# Patient Record
Sex: MALE | Race: WHITE | NOT HISPANIC OR LATINO | URBAN - METROPOLITAN AREA
[De-identification: names, ages, dates, MRNs, and addresses within clinical notes are randomized per-mention and may not be internally consistent; named-entity substitution may affect disease eponyms.]

---

## 2017-01-27 ENCOUNTER — APPOINTMENT (RX ONLY)
Dept: URBAN - METROPOLITAN AREA CLINIC 167 | Facility: CLINIC | Age: 77
Setting detail: DERMATOLOGY
End: 2017-01-27

## 2017-01-27 DIAGNOSIS — D22 MELANOCYTIC NEVI: ICD-10-CM

## 2017-01-27 DIAGNOSIS — Z71.89 OTHER SPECIFIED COUNSELING: ICD-10-CM

## 2017-01-27 DIAGNOSIS — D18.0 HEMANGIOMA: ICD-10-CM

## 2017-01-27 DIAGNOSIS — L82.1 OTHER SEBORRHEIC KERATOSIS: ICD-10-CM

## 2017-01-27 DIAGNOSIS — Z85.828 PERSONAL HISTORY OF OTHER MALIGNANT NEOPLASM OF SKIN: ICD-10-CM

## 2017-01-27 DIAGNOSIS — L21.8 OTHER SEBORRHEIC DERMATITIS: ICD-10-CM

## 2017-01-27 PROBLEM — D22.5 MELANOCYTIC NEVI OF TRUNK: Status: ACTIVE | Noted: 2017-01-27

## 2017-01-27 PROBLEM — D18.01 HEMANGIOMA OF SKIN AND SUBCUTANEOUS TISSUE: Status: ACTIVE | Noted: 2017-01-27

## 2017-01-27 PROCEDURE — ? COUNSELING

## 2017-01-27 PROCEDURE — ? TREATMENT REGIMEN

## 2017-01-27 PROCEDURE — 99213 OFFICE O/P EST LOW 20 MIN: CPT

## 2017-01-27 ASSESSMENT — LOCATION DETAILED DESCRIPTION DERM
LOCATION DETAILED: RIGHT MEDIAL UPPER BACK
LOCATION DETAILED: LEFT INFERIOR MEDIAL UPPER BACK
LOCATION DETAILED: LEFT SUPERIOR MEDIAL UPPER BACK
LOCATION DETAILED: LEFT CENTRAL MALAR CHEEK
LOCATION DETAILED: LEFT DISTAL POSTERIOR UPPER ARM
LOCATION DETAILED: LEFT DISTAL POSTERIOR THIGH
LOCATION DETAILED: POSTERIOR MID-PARIETAL SCALP

## 2017-01-27 ASSESSMENT — LOCATION ZONE DERM
LOCATION ZONE: ARM
LOCATION ZONE: FACE
LOCATION ZONE: SCALP
LOCATION ZONE: TRUNK
LOCATION ZONE: LEG

## 2017-01-27 ASSESSMENT — LOCATION SIMPLE DESCRIPTION DERM
LOCATION SIMPLE: RIGHT UPPER BACK
LOCATION SIMPLE: LEFT UPPER ARM
LOCATION SIMPLE: LEFT UPPER BACK
LOCATION SIMPLE: POSTERIOR SCALP
LOCATION SIMPLE: LEFT CHEEK
LOCATION SIMPLE: LEFT POSTERIOR THIGH

## 2017-08-28 ENCOUNTER — APPOINTMENT (RX ONLY)
Dept: URBAN - METROPOLITAN AREA CLINIC 167 | Facility: CLINIC | Age: 77
Setting detail: DERMATOLOGY
End: 2017-08-28

## 2017-08-28 DIAGNOSIS — Z85.828 PERSONAL HISTORY OF OTHER MALIGNANT NEOPLASM OF SKIN: ICD-10-CM

## 2017-08-28 DIAGNOSIS — D22 MELANOCYTIC NEVI: ICD-10-CM

## 2017-08-28 DIAGNOSIS — L21.8 OTHER SEBORRHEIC DERMATITIS: ICD-10-CM

## 2017-08-28 DIAGNOSIS — Z71.89 OTHER SPECIFIED COUNSELING: ICD-10-CM

## 2017-08-28 DIAGNOSIS — L82.1 OTHER SEBORRHEIC KERATOSIS: ICD-10-CM

## 2017-08-28 PROBLEM — D22.71 MELANOCYTIC NEVI OF RIGHT LOWER LIMB, INCLUDING HIP: Status: ACTIVE | Noted: 2017-08-28

## 2017-08-28 PROBLEM — D22.5 MELANOCYTIC NEVI OF TRUNK: Status: ACTIVE | Noted: 2017-08-28

## 2017-08-28 PROBLEM — D22.61 MELANOCYTIC NEVI OF RIGHT UPPER LIMB, INCLUDING SHOULDER: Status: ACTIVE | Noted: 2017-08-28

## 2017-08-28 PROBLEM — D22.72 MELANOCYTIC NEVI OF LEFT LOWER LIMB, INCLUDING HIP: Status: ACTIVE | Noted: 2017-08-28

## 2017-08-28 PROBLEM — D22.62 MELANOCYTIC NEVI OF LEFT UPPER LIMB, INCLUDING SHOULDER: Status: ACTIVE | Noted: 2017-08-28

## 2017-08-28 PROCEDURE — ? TREATMENT REGIMEN

## 2017-08-28 PROCEDURE — ? COUNSELING

## 2017-08-28 PROCEDURE — 99214 OFFICE O/P EST MOD 30 MIN: CPT

## 2017-08-28 PROCEDURE — ? PRESCRIPTION

## 2017-08-28 RX ORDER — HYDROCORTISONE 25 MG/G
CREAM TOPICAL
Qty: 1 | Refills: 4 | Status: ERX | COMMUNITY
Start: 2017-08-28

## 2017-08-28 RX ADMIN — HYDROCORTISONE: 25 CREAM TOPICAL at 00:00

## 2017-08-28 ASSESSMENT — LOCATION DETAILED DESCRIPTION DERM
LOCATION DETAILED: RIGHT INFERIOR MEDIAL UPPER BACK
LOCATION DETAILED: GLABELLA
LOCATION DETAILED: INFERIOR THORACIC SPINE
LOCATION DETAILED: RIGHT DISTAL POSTERIOR THIGH
LOCATION DETAILED: LEFT DISTAL POSTERIOR THIGH
LOCATION DETAILED: RIGHT DISTAL POSTERIOR UPPER ARM
LOCATION DETAILED: RIGHT PROXIMAL PRETIBIAL REGION
LOCATION DETAILED: LEFT CENTRAL MALAR CHEEK
LOCATION DETAILED: LEFT DISTAL POSTERIOR UPPER ARM

## 2017-08-28 ASSESSMENT — LOCATION ZONE DERM
LOCATION ZONE: TRUNK
LOCATION ZONE: LEG
LOCATION ZONE: FACE
LOCATION ZONE: ARM

## 2017-08-28 ASSESSMENT — LOCATION SIMPLE DESCRIPTION DERM
LOCATION SIMPLE: UPPER BACK
LOCATION SIMPLE: RIGHT UPPER BACK
LOCATION SIMPLE: LEFT CHEEK
LOCATION SIMPLE: RIGHT POSTERIOR THIGH
LOCATION SIMPLE: RIGHT PRETIBIAL REGION
LOCATION SIMPLE: LEFT POSTERIOR THIGH
LOCATION SIMPLE: RIGHT UPPER ARM
LOCATION SIMPLE: GLABELLA
LOCATION SIMPLE: LEFT UPPER ARM

## 2018-02-26 ENCOUNTER — APPOINTMENT (RX ONLY)
Dept: URBAN - METROPOLITAN AREA CLINIC 167 | Facility: CLINIC | Age: 78
Setting detail: DERMATOLOGY
End: 2018-02-26

## 2018-02-26 DIAGNOSIS — Z85.828 PERSONAL HISTORY OF OTHER MALIGNANT NEOPLASM OF SKIN: ICD-10-CM

## 2018-02-26 DIAGNOSIS — L82.1 OTHER SEBORRHEIC KERATOSIS: ICD-10-CM

## 2018-02-26 DIAGNOSIS — D22 MELANOCYTIC NEVI: ICD-10-CM

## 2018-02-26 DIAGNOSIS — L21.8 OTHER SEBORRHEIC DERMATITIS: ICD-10-CM

## 2018-02-26 DIAGNOSIS — D18.0 HEMANGIOMA: ICD-10-CM

## 2018-02-26 PROBLEM — D22.61 MELANOCYTIC NEVI OF RIGHT UPPER LIMB, INCLUDING SHOULDER: Status: ACTIVE | Noted: 2018-02-26

## 2018-02-26 PROBLEM — D22.5 MELANOCYTIC NEVI OF TRUNK: Status: ACTIVE | Noted: 2018-02-26

## 2018-02-26 PROBLEM — D18.01 HEMANGIOMA OF SKIN AND SUBCUTANEOUS TISSUE: Status: ACTIVE | Noted: 2018-02-26

## 2018-02-26 PROBLEM — D22.62 MELANOCYTIC NEVI OF LEFT UPPER LIMB, INCLUDING SHOULDER: Status: ACTIVE | Noted: 2018-02-26

## 2018-02-26 PROBLEM — D22.72 MELANOCYTIC NEVI OF LEFT LOWER LIMB, INCLUDING HIP: Status: ACTIVE | Noted: 2018-02-26

## 2018-02-26 PROBLEM — D22.71 MELANOCYTIC NEVI OF RIGHT LOWER LIMB, INCLUDING HIP: Status: ACTIVE | Noted: 2018-02-26

## 2018-02-26 PROCEDURE — ? COUNSELING

## 2018-02-26 PROCEDURE — 99213 OFFICE O/P EST LOW 20 MIN: CPT

## 2018-02-26 ASSESSMENT — LOCATION SIMPLE DESCRIPTION DERM
LOCATION SIMPLE: RIGHT UPPER BACK
LOCATION SIMPLE: ABDOMEN
LOCATION SIMPLE: UPPER BACK
LOCATION SIMPLE: RIGHT PRETIBIAL REGION
LOCATION SIMPLE: RIGHT POSTERIOR THIGH
LOCATION SIMPLE: LEFT POSTERIOR THIGH
LOCATION SIMPLE: LEFT UPPER ARM
LOCATION SIMPLE: RIGHT UPPER ARM
LOCATION SIMPLE: POSTERIOR SCALP
LOCATION SIMPLE: LEFT CHEEK

## 2018-02-26 ASSESSMENT — LOCATION DETAILED DESCRIPTION DERM
LOCATION DETAILED: RIGHT DISTAL POSTERIOR UPPER ARM
LOCATION DETAILED: RIGHT DISTAL POSTERIOR THIGH
LOCATION DETAILED: INFERIOR THORACIC SPINE
LOCATION DETAILED: RIGHT INFERIOR MEDIAL UPPER BACK
LOCATION DETAILED: RIGHT PROXIMAL PRETIBIAL REGION
LOCATION DETAILED: LEFT CENTRAL MALAR CHEEK
LOCATION DETAILED: LEFT DISTAL POSTERIOR UPPER ARM
LOCATION DETAILED: POSTERIOR MID-PARIETAL SCALP
LOCATION DETAILED: LEFT DISTAL POSTERIOR THIGH
LOCATION DETAILED: PERIUMBILICAL SKIN

## 2018-02-26 ASSESSMENT — LOCATION ZONE DERM
LOCATION ZONE: ARM
LOCATION ZONE: SCALP
LOCATION ZONE: TRUNK
LOCATION ZONE: FACE
LOCATION ZONE: LEG

## 2018-09-06 ENCOUNTER — APPOINTMENT (RX ONLY)
Dept: URBAN - METROPOLITAN AREA CLINIC 166 | Facility: CLINIC | Age: 78
Setting detail: DERMATOLOGY
End: 2018-09-06

## 2018-09-06 DIAGNOSIS — L82.1 OTHER SEBORRHEIC KERATOSIS: ICD-10-CM

## 2018-09-06 DIAGNOSIS — Z85.828 PERSONAL HISTORY OF OTHER MALIGNANT NEOPLASM OF SKIN: ICD-10-CM

## 2018-09-06 DIAGNOSIS — D22 MELANOCYTIC NEVI: ICD-10-CM

## 2018-09-06 DIAGNOSIS — D18.0 HEMANGIOMA: ICD-10-CM

## 2018-09-06 PROBLEM — D22.5 MELANOCYTIC NEVI OF TRUNK: Status: ACTIVE | Noted: 2018-09-06

## 2018-09-06 PROBLEM — D22.72 MELANOCYTIC NEVI OF LEFT LOWER LIMB, INCLUDING HIP: Status: ACTIVE | Noted: 2018-09-06

## 2018-09-06 PROBLEM — D22.61 MELANOCYTIC NEVI OF RIGHT UPPER LIMB, INCLUDING SHOULDER: Status: ACTIVE | Noted: 2018-09-06

## 2018-09-06 PROBLEM — D22.71 MELANOCYTIC NEVI OF RIGHT LOWER LIMB, INCLUDING HIP: Status: ACTIVE | Noted: 2018-09-06

## 2018-09-06 PROBLEM — D18.01 HEMANGIOMA OF SKIN AND SUBCUTANEOUS TISSUE: Status: ACTIVE | Noted: 2018-09-06

## 2018-09-06 PROBLEM — D22.62 MELANOCYTIC NEVI OF LEFT UPPER LIMB, INCLUDING SHOULDER: Status: ACTIVE | Noted: 2018-09-06

## 2018-09-06 PROCEDURE — 99213 OFFICE O/P EST LOW 20 MIN: CPT

## 2018-09-06 PROCEDURE — ? COUNSELING

## 2018-09-06 ASSESSMENT — LOCATION SIMPLE DESCRIPTION DERM
LOCATION SIMPLE: ABDOMEN
LOCATION SIMPLE: LEFT CHEEK
LOCATION SIMPLE: RIGHT UPPER ARM
LOCATION SIMPLE: RIGHT UPPER BACK
LOCATION SIMPLE: LOWER BACK
LOCATION SIMPLE: RIGHT PRETIBIAL REGION
LOCATION SIMPLE: LEFT UPPER ARM
LOCATION SIMPLE: LEFT THIGH
LOCATION SIMPLE: UPPER BACK
LOCATION SIMPLE: RIGHT THIGH

## 2018-09-06 ASSESSMENT — LOCATION DETAILED DESCRIPTION DERM
LOCATION DETAILED: RIGHT INFERIOR UPPER BACK
LOCATION DETAILED: RIGHT DISTAL POSTERIOR UPPER ARM
LOCATION DETAILED: LEFT DISTAL POSTERIOR UPPER ARM
LOCATION DETAILED: INFERIOR THORACIC SPINE
LOCATION DETAILED: RIGHT ANTERIOR PROXIMAL THIGH
LOCATION DETAILED: LEFT PROXIMAL POSTERIOR UPPER ARM
LOCATION DETAILED: RIGHT PROXIMAL PRETIBIAL REGION
LOCATION DETAILED: RIGHT PROXIMAL POSTERIOR UPPER ARM
LOCATION DETAILED: SUPERIOR LUMBAR SPINE
LOCATION DETAILED: LEFT CENTRAL MALAR CHEEK
LOCATION DETAILED: PERIUMBILICAL SKIN
LOCATION DETAILED: EPIGASTRIC SKIN
LOCATION DETAILED: LEFT ANTERIOR PROXIMAL THIGH

## 2018-09-06 ASSESSMENT — LOCATION ZONE DERM
LOCATION ZONE: LEG
LOCATION ZONE: TRUNK
LOCATION ZONE: FACE
LOCATION ZONE: ARM

## 2019-04-11 ENCOUNTER — APPOINTMENT (RX ONLY)
Dept: URBAN - METROPOLITAN AREA CLINIC 166 | Facility: CLINIC | Age: 79
Setting detail: DERMATOLOGY
End: 2019-04-11

## 2019-04-11 DIAGNOSIS — L82.1 OTHER SEBORRHEIC KERATOSIS: ICD-10-CM

## 2019-04-11 DIAGNOSIS — D22 MELANOCYTIC NEVI: ICD-10-CM

## 2019-04-11 DIAGNOSIS — Z85.828 PERSONAL HISTORY OF OTHER MALIGNANT NEOPLASM OF SKIN: ICD-10-CM

## 2019-04-11 DIAGNOSIS — D18.0 HEMANGIOMA: ICD-10-CM

## 2019-04-11 PROBLEM — D18.01 HEMANGIOMA OF SKIN AND SUBCUTANEOUS TISSUE: Status: ACTIVE | Noted: 2019-04-11

## 2019-04-11 PROBLEM — D22.72 MELANOCYTIC NEVI OF LEFT LOWER LIMB, INCLUDING HIP: Status: ACTIVE | Noted: 2019-04-11

## 2019-04-11 PROBLEM — D22.71 MELANOCYTIC NEVI OF RIGHT LOWER LIMB, INCLUDING HIP: Status: ACTIVE | Noted: 2019-04-11

## 2019-04-11 PROBLEM — D22.61 MELANOCYTIC NEVI OF RIGHT UPPER LIMB, INCLUDING SHOULDER: Status: ACTIVE | Noted: 2019-04-11

## 2019-04-11 PROBLEM — D22.62 MELANOCYTIC NEVI OF LEFT UPPER LIMB, INCLUDING SHOULDER: Status: ACTIVE | Noted: 2019-04-11

## 2019-04-11 PROBLEM — D22.5 MELANOCYTIC NEVI OF TRUNK: Status: ACTIVE | Noted: 2019-04-11

## 2019-04-11 PROCEDURE — ? COUNSELING

## 2019-04-11 PROCEDURE — 99213 OFFICE O/P EST LOW 20 MIN: CPT

## 2019-04-11 ASSESSMENT — LOCATION SIMPLE DESCRIPTION DERM
LOCATION SIMPLE: LEFT CALF
LOCATION SIMPLE: LEFT CHEEK
LOCATION SIMPLE: RIGHT PRETIBIAL REGION
LOCATION SIMPLE: RIGHT UPPER ARM
LOCATION SIMPLE: LEFT UPPER ARM
LOCATION SIMPLE: UPPER BACK
LOCATION SIMPLE: ABDOMEN
LOCATION SIMPLE: RIGHT CALF

## 2019-04-11 ASSESSMENT — LOCATION DETAILED DESCRIPTION DERM
LOCATION DETAILED: RIGHT RIB CAGE
LOCATION DETAILED: RIGHT PROXIMAL POSTERIOR UPPER ARM
LOCATION DETAILED: INFERIOR THORACIC SPINE
LOCATION DETAILED: LEFT CENTRAL MALAR CHEEK
LOCATION DETAILED: RIGHT DISTAL CALF
LOCATION DETAILED: RIGHT PROXIMAL CALF
LOCATION DETAILED: LEFT PROXIMAL LATERAL CALF
LOCATION DETAILED: SUPERIOR THORACIC SPINE
LOCATION DETAILED: LEFT PROXIMAL POSTERIOR UPPER ARM
LOCATION DETAILED: LEFT DISTAL CALF
LOCATION DETAILED: RIGHT PROXIMAL PRETIBIAL REGION

## 2019-04-11 ASSESSMENT — LOCATION ZONE DERM
LOCATION ZONE: FACE
LOCATION ZONE: LEG
LOCATION ZONE: ARM
LOCATION ZONE: TRUNK

## 2019-10-10 ENCOUNTER — APPOINTMENT (RX ONLY)
Dept: URBAN - METROPOLITAN AREA CLINIC 166 | Facility: CLINIC | Age: 79
Setting detail: DERMATOLOGY
End: 2019-10-10

## 2019-10-10 DIAGNOSIS — Z85.828 PERSONAL HISTORY OF OTHER MALIGNANT NEOPLASM OF SKIN: ICD-10-CM

## 2019-10-10 DIAGNOSIS — D18.0 HEMANGIOMA: ICD-10-CM

## 2019-10-10 DIAGNOSIS — L82.1 OTHER SEBORRHEIC KERATOSIS: ICD-10-CM

## 2019-10-10 DIAGNOSIS — Z71.89 OTHER SPECIFIED COUNSELING: ICD-10-CM

## 2019-10-10 DIAGNOSIS — D22 MELANOCYTIC NEVI: ICD-10-CM

## 2019-10-10 DIAGNOSIS — L21.8 OTHER SEBORRHEIC DERMATITIS: ICD-10-CM

## 2019-10-10 PROBLEM — D22.72 MELANOCYTIC NEVI OF LEFT LOWER LIMB, INCLUDING HIP: Status: ACTIVE | Noted: 2019-10-10

## 2019-10-10 PROBLEM — D22.62 MELANOCYTIC NEVI OF LEFT UPPER LIMB, INCLUDING SHOULDER: Status: ACTIVE | Noted: 2019-10-10

## 2019-10-10 PROBLEM — D48.5 NEOPLASM OF UNCERTAIN BEHAVIOR OF SKIN: Status: ACTIVE | Noted: 2019-10-10

## 2019-10-10 PROBLEM — D18.01 HEMANGIOMA OF SKIN AND SUBCUTANEOUS TISSUE: Status: ACTIVE | Noted: 2019-10-10

## 2019-10-10 PROBLEM — D22.5 MELANOCYTIC NEVI OF TRUNK: Status: ACTIVE | Noted: 2019-10-10

## 2019-10-10 PROBLEM — D22.71 MELANOCYTIC NEVI OF RIGHT LOWER LIMB, INCLUDING HIP: Status: ACTIVE | Noted: 2019-10-10

## 2019-10-10 PROBLEM — D22.61 MELANOCYTIC NEVI OF RIGHT UPPER LIMB, INCLUDING SHOULDER: Status: ACTIVE | Noted: 2019-10-10

## 2019-10-10 PROCEDURE — ? BIOPSY BY SHAVE METHOD AND DESTRUCTION

## 2019-10-10 PROCEDURE — 17262 DSTRJ MAL LES T/A/L 1.1-2.0: CPT

## 2019-10-10 PROCEDURE — ? TREATMENT REGIMEN

## 2019-10-10 PROCEDURE — 99213 OFFICE O/P EST LOW 20 MIN: CPT | Mod: 25

## 2019-10-10 PROCEDURE — ? COUNSELING

## 2019-10-10 ASSESSMENT — LOCATION DETAILED DESCRIPTION DERM
LOCATION DETAILED: LEFT DISTAL CALF
LOCATION DETAILED: INFERIOR THORACIC SPINE
LOCATION DETAILED: LEFT CENTRAL MALAR CHEEK
LOCATION DETAILED: RIGHT PROXIMAL PRETIBIAL REGION
LOCATION DETAILED: SUPERIOR THORACIC SPINE
LOCATION DETAILED: RIGHT RIB CAGE
LOCATION DETAILED: LEFT PROXIMAL LATERAL CALF
LOCATION DETAILED: RIGHT PROXIMAL CALF
LOCATION DETAILED: LEFT MEDIAL FOREHEAD
LOCATION DETAILED: LEFT PROXIMAL POSTERIOR UPPER ARM
LOCATION DETAILED: LEFT DISTAL POSTERIOR UPPER ARM
LOCATION DETAILED: RIGHT PROXIMAL POSTERIOR UPPER ARM
LOCATION DETAILED: RIGHT DISTAL POSTERIOR UPPER ARM
LOCATION DETAILED: RIGHT DISTAL CALF

## 2019-10-10 ASSESSMENT — LOCATION SIMPLE DESCRIPTION DERM
LOCATION SIMPLE: LEFT CALF
LOCATION SIMPLE: LEFT UPPER ARM
LOCATION SIMPLE: UPPER BACK
LOCATION SIMPLE: RIGHT PRETIBIAL REGION
LOCATION SIMPLE: RIGHT UPPER ARM
LOCATION SIMPLE: LEFT CHEEK
LOCATION SIMPLE: ABDOMEN
LOCATION SIMPLE: LEFT FOREHEAD
LOCATION SIMPLE: RIGHT CALF

## 2019-10-10 ASSESSMENT — LOCATION ZONE DERM
LOCATION ZONE: TRUNK
LOCATION ZONE: LEG
LOCATION ZONE: FACE
LOCATION ZONE: ARM

## 2019-10-10 NOTE — PROCEDURE: BIOPSY BY SHAVE METHOD AND DESTRUCTION
Detail Level: Detailed
Render Post-Care Instructions In Note?: no
Wound Care: Aquaphor
Anesthesia Type: 1% lidocaine with epinephrine
Biopsy Type: H and E
Lab: [id number]
Billing Type: Third-Party Bill
Destruction Type: electrodesiccation
Post-Care Instructions: I reviewed with the patient in detail post-care instructions. Patient is to keep the biopsy site dry overnight, and then apply bacitracin twice daily until healed. Patient may apply hydrogen peroxide soaks to remove any crusting.
Size Of Lesion In Cm (Optional): 0.8
Size Of Lesion After Curettage: 1.2
Lab Facility: [id number]
Consent: Written consent was obtained and risks were reviewed including but not limited to scarring, infection, bleeding, scabbing, incomplete removal, nerve damage and allergy to anesthesia.
Notification Instructions: Patient will be notified of biopsy results. However, patient instructed to call the office if not contacted within 2 weeks.
Number Of Curettages: 2
Anesthesia Volume In Cc: 1
Hemostasis: Aluminum Chloride
Bill As?: Malignant Destruction

## 2020-04-07 ENCOUNTER — APPOINTMENT (RX ONLY)
Dept: URBAN - METROPOLITAN AREA CLINIC 167 | Facility: CLINIC | Age: 80
Setting detail: DERMATOLOGY
End: 2020-04-07

## 2020-04-07 DIAGNOSIS — Z85.828 PERSONAL HISTORY OF OTHER MALIGNANT NEOPLASM OF SKIN: ICD-10-CM

## 2020-04-07 DIAGNOSIS — L82.1 OTHER SEBORRHEIC KERATOSIS: ICD-10-CM

## 2020-04-07 DIAGNOSIS — D22 MELANOCYTIC NEVI: ICD-10-CM

## 2020-04-07 DIAGNOSIS — D18.0 HEMANGIOMA: ICD-10-CM

## 2020-04-07 DIAGNOSIS — L81.4 OTHER MELANIN HYPERPIGMENTATION: ICD-10-CM

## 2020-04-07 DIAGNOSIS — L21.8 OTHER SEBORRHEIC DERMATITIS: ICD-10-CM

## 2020-04-07 PROBLEM — D22.62 MELANOCYTIC NEVI OF LEFT UPPER LIMB, INCLUDING SHOULDER: Status: ACTIVE | Noted: 2020-04-07

## 2020-04-07 PROBLEM — D22.71 MELANOCYTIC NEVI OF RIGHT LOWER LIMB, INCLUDING HIP: Status: ACTIVE | Noted: 2020-04-07

## 2020-04-07 PROBLEM — D48.5 NEOPLASM OF UNCERTAIN BEHAVIOR OF SKIN: Status: ACTIVE | Noted: 2020-04-07

## 2020-04-07 PROBLEM — D18.01 HEMANGIOMA OF SKIN AND SUBCUTANEOUS TISSUE: Status: ACTIVE | Noted: 2020-04-07

## 2020-04-07 PROBLEM — D22.72 MELANOCYTIC NEVI OF LEFT LOWER LIMB, INCLUDING HIP: Status: ACTIVE | Noted: 2020-04-07

## 2020-04-07 PROBLEM — D22.61 MELANOCYTIC NEVI OF RIGHT UPPER LIMB, INCLUDING SHOULDER: Status: ACTIVE | Noted: 2020-04-07

## 2020-04-07 PROBLEM — D23.122 OTHER BENIGN NEOPLASM OF SKIN OF LEFT LOWER EYELID, INCLUDING CANTHUS: Status: ACTIVE | Noted: 2020-04-07

## 2020-04-07 PROBLEM — D22.5 MELANOCYTIC NEVI OF TRUNK: Status: ACTIVE | Noted: 2020-04-07

## 2020-04-07 PROCEDURE — ? COUNSELING

## 2020-04-07 PROCEDURE — 99213 OFFICE O/P EST LOW 20 MIN: CPT | Mod: 25

## 2020-04-07 PROCEDURE — ? TREATMENT REGIMEN

## 2020-04-07 PROCEDURE — ? BIOPSY BY SHAVE METHOD

## 2020-04-07 PROCEDURE — 11102 TANGNTL BX SKIN SINGLE LES: CPT

## 2020-04-07 ASSESSMENT — LOCATION DETAILED DESCRIPTION DERM
LOCATION DETAILED: LEFT TARSAL MARGIN OF THE INFERIOR EYELID
LOCATION DETAILED: INFERIOR THORACIC SPINE
LOCATION DETAILED: LEFT CENTRAL MALAR CHEEK
LOCATION DETAILED: LEFT DISTAL LATERAL POSTERIOR THIGH
LOCATION DETAILED: LEFT POSTERIOR SHOULDER
LOCATION DETAILED: PERIUMBILICAL SKIN
LOCATION DETAILED: SUPERIOR LUMBAR SPINE
LOCATION DETAILED: LEFT PROXIMAL PRETIBIAL REGION
LOCATION DETAILED: RIGHT TARSAL MARGIN OF THE INFERIOR EYELID
LOCATION DETAILED: RIGHT DISTAL DORSAL FOREARM
LOCATION DETAILED: RIGHT POSTERIOR SHOULDER
LOCATION DETAILED: RIGHT PROXIMAL PRETIBIAL REGION
LOCATION DETAILED: MID POSTERIOR NECK
LOCATION DETAILED: RIGHT DISTAL POSTERIOR THIGH
LOCATION DETAILED: LEFT DISTAL RADIAL DORSAL FOREARM
LOCATION DETAILED: LEFT ANTERIOR DISTAL UPPER ARM
LOCATION DETAILED: RIGHT ANTERIOR PROXIMAL UPPER ARM
LOCATION DETAILED: SUPERIOR THORACIC SPINE
LOCATION DETAILED: STERNUM

## 2020-04-07 ASSESSMENT — LOCATION SIMPLE DESCRIPTION DERM
LOCATION SIMPLE: LEFT CHEEK
LOCATION SIMPLE: LEFT FOREARM
LOCATION SIMPLE: RIGHT FOREARM
LOCATION SIMPLE: ABDOMEN
LOCATION SIMPLE: LEFT SHOULDER
LOCATION SIMPLE: RIGHT SHOULDER
LOCATION SIMPLE: RIGHT UPPER ARM
LOCATION SIMPLE: UPPER BACK
LOCATION SIMPLE: CHEST
LOCATION SIMPLE: LEFT POSTERIOR THIGH
LOCATION SIMPLE: LOWER BACK
LOCATION SIMPLE: RIGHT PRETIBIAL REGION
LOCATION SIMPLE: POSTERIOR NECK
LOCATION SIMPLE: RIGHT POSTERIOR THIGH
LOCATION SIMPLE: LEFT UPPER ARM
LOCATION SIMPLE: LEFT LOWER LID TARSAL MARGIN
LOCATION SIMPLE: LEFT PRETIBIAL REGION
LOCATION SIMPLE: RIGHT LOWER LID TARSAL MARGIN

## 2020-04-07 ASSESSMENT — LOCATION ZONE DERM
LOCATION ZONE: EYELID
LOCATION ZONE: TRUNK
LOCATION ZONE: FACE
LOCATION ZONE: LEG
LOCATION ZONE: ARM
LOCATION ZONE: NECK

## 2020-04-07 NOTE — PROCEDURE: BIOPSY BY SHAVE METHOD
Detail Level: Detailed
Depth Of Biopsy: dermis
Was A Bandage Applied: Yes
Size Of Lesion In Cm: 0.5
X Size Of Lesion In Cm: 0
Biopsy Type: H and E
Biopsy Method: double edge Personna blade
Hemostasis: Pressure
Wound Care: Vaseline
Dressing: bandage
Destruction After The Procedure: No
Type Of Destruction Used: Curettage
Cryotherapy Text: The wound bed was treated with cryotherapy after the biopsy was performed.
Electrodesiccation Text: The wound bed was treated with electrodesiccation after the biopsy was performed.
Electrodesiccation And Curettage Text: The wound bed was treated with electrodesiccation and curettage after the biopsy was performed.
Silver Nitrate Text: The wound bed was treated with silver nitrate after the biopsy was performed.
Lab: [id number]
Lab Facility: [id number]
Consent: Written consent was obtained and risks were reviewed including but not limited to scarring, infection, bleeding, scabbing, incomplete removal, nerve damage and allergy to anesthesia.
Post-Care Instructions: I reviewed with the patient in detail post-care instructions. Patient is to keep the biopsy site dry overnight, and then apply bacitracin twice daily until healed. Patient may apply hydrogen peroxide soaks to remove any crusting.
Notification Instructions: Patient will be notified of biopsy results. However, patient instructed to call the office if not contacted within 2 weeks.
Billing Type: Third-Party Bill
Information: Selecting Yes will display possible errors in your note based on the variables you have selected. This validation is only offered as a suggestion for you. PLEASE NOTE THAT THE VALIDATION TEXT WILL BE REMOVED WHEN YOU FINALIZE YOUR NOTE. IF YOU WANT TO FAX A PRELIMINARY NOTE YOU WILL NEED TO TOGGLE THIS TO 'NO' IF YOU DO NOT WANT IT IN YOUR FAXED NOTE.

## 2020-10-06 ENCOUNTER — APPOINTMENT (RX ONLY)
Dept: URBAN - METROPOLITAN AREA CLINIC 167 | Facility: CLINIC | Age: 80
Setting detail: DERMATOLOGY
End: 2020-10-06

## 2020-10-06 DIAGNOSIS — L82.1 OTHER SEBORRHEIC KERATOSIS: ICD-10-CM

## 2020-10-06 DIAGNOSIS — L82.0 INFLAMED SEBORRHEIC KERATOSIS: ICD-10-CM

## 2020-10-06 DIAGNOSIS — L81.4 OTHER MELANIN HYPERPIGMENTATION: ICD-10-CM

## 2020-10-06 DIAGNOSIS — Z85.828 PERSONAL HISTORY OF OTHER MALIGNANT NEOPLASM OF SKIN: ICD-10-CM

## 2020-10-06 DIAGNOSIS — D22 MELANOCYTIC NEVI: ICD-10-CM

## 2020-10-06 DIAGNOSIS — D18.0 HEMANGIOMA: ICD-10-CM

## 2020-10-06 DIAGNOSIS — B07.8 OTHER VIRAL WARTS: ICD-10-CM

## 2020-10-06 PROBLEM — D18.01 HEMANGIOMA OF SKIN AND SUBCUTANEOUS TISSUE: Status: ACTIVE | Noted: 2020-10-06

## 2020-10-06 PROBLEM — D22.5 MELANOCYTIC NEVI OF TRUNK: Status: ACTIVE | Noted: 2020-10-06

## 2020-10-06 PROCEDURE — 17110 DESTRUCTION B9 LES UP TO 14: CPT

## 2020-10-06 PROCEDURE — 99213 OFFICE O/P EST LOW 20 MIN: CPT | Mod: 25

## 2020-10-06 PROCEDURE — ? LIQUID NITROGEN

## 2020-10-06 PROCEDURE — ? COUNSELING

## 2020-10-06 ASSESSMENT — LOCATION DETAILED DESCRIPTION DERM
LOCATION DETAILED: RIGHT POSTERIOR SHOULDER
LOCATION DETAILED: MID POSTERIOR NECK
LOCATION DETAILED: RIGHT PROXIMAL PRETIBIAL REGION
LOCATION DETAILED: LEFT ANTERIOR DISTAL THIGH
LOCATION DETAILED: LEFT CENTRAL MALAR CHEEK
LOCATION DETAILED: LEFT LATERAL ELBOW
LOCATION DETAILED: RIGHT DISTAL POSTERIOR UPPER ARM
LOCATION DETAILED: INFERIOR THORACIC SPINE
LOCATION DETAILED: RIGHT INFERIOR ANTERIOR NECK
LOCATION DETAILED: LEFT DISTAL RADIAL DORSAL FOREARM
LOCATION DETAILED: LEFT PROXIMAL PRETIBIAL REGION
LOCATION DETAILED: LEFT POSTERIOR SHOULDER
LOCATION DETAILED: RIGHT ANTERIOR DISTAL THIGH
LOCATION DETAILED: SUPERIOR LUMBAR SPINE
LOCATION DETAILED: STERNUM
LOCATION DETAILED: LEFT INFERIOR ANTERIOR NECK
LOCATION DETAILED: RIGHT DISTAL DORSAL FOREARM
LOCATION DETAILED: SUPERIOR THORACIC SPINE
LOCATION DETAILED: LEFT CLAVICULAR NECK
LOCATION DETAILED: LEFT DISTAL POSTERIOR UPPER ARM
LOCATION DETAILED: RIGHT CLAVICULAR NECK
LOCATION DETAILED: PERIUMBILICAL SKIN

## 2020-10-06 ASSESSMENT — LOCATION SIMPLE DESCRIPTION DERM
LOCATION SIMPLE: LEFT ELBOW
LOCATION SIMPLE: RIGHT ANTERIOR NECK
LOCATION SIMPLE: LEFT THIGH
LOCATION SIMPLE: RIGHT PRETIBIAL REGION
LOCATION SIMPLE: LEFT PRETIBIAL REGION
LOCATION SIMPLE: LOWER BACK
LOCATION SIMPLE: UPPER BACK
LOCATION SIMPLE: RIGHT UPPER ARM
LOCATION SIMPLE: RIGHT THIGH
LOCATION SIMPLE: LEFT CHEEK
LOCATION SIMPLE: RIGHT SHOULDER
LOCATION SIMPLE: RIGHT FOREARM
LOCATION SIMPLE: CHEST
LOCATION SIMPLE: LEFT ANTERIOR NECK
LOCATION SIMPLE: POSTERIOR NECK
LOCATION SIMPLE: LEFT UPPER ARM
LOCATION SIMPLE: LEFT SHOULDER
LOCATION SIMPLE: ABDOMEN
LOCATION SIMPLE: LEFT FOREARM

## 2020-10-06 ASSESSMENT — LOCATION ZONE DERM
LOCATION ZONE: NECK
LOCATION ZONE: FACE
LOCATION ZONE: ARM
LOCATION ZONE: LEG
LOCATION ZONE: TRUNK

## 2020-10-06 NOTE — HPI: NON-MELANOMA SKIN CANCER F/U (HISTORY OF NMSC)
How Many Skin Cancers Have You Had?: one
What Is The Reason For Today's Visit?: History of Non-Melanoma Skin Cancer
Additional History: Concerned with spot near scar from open surgery on his chest.
When Was Your Last Cancer Diagnosed?: 2015

## 2020-10-06 NOTE — PROCEDURE: LIQUID NITROGEN
Medical Necessity Clause: This procedure was medically necessary because the lesions that were treated were:irritated by picking
Number Of Freeze-Thaw Cycles: 1 freeze-thaw cycle
Render Note In Bullet Format When Appropriate: No
Include Z78.9 (Other Specified Conditions Influencing Health Status) As An Associated Diagnosis?: Yes
Detail Level: Simple
Consent: The patient's consent was obtained including but not limited to risks of crusting, scabbing, blistering, scarring, darker or lighter pigmentary change, recurrence, incomplete removal and infection.
Medical Necessity Information: It is in your best interest to select a reason for this procedure from the list below. All of these items fulfill various CMS LCD requirements except the new and changing color options.
Post-Care Instructions: I reviewed with the patient in detail post-care instructions. Patient is to wear sunprotection, and avoid picking at any of the treated lesions. Pt may apply Vaseline to crusted or scabbing areas.

## 2020-10-06 NOTE — HPI: EVALUATION OF SKIN LESION(S)
What Type Of Note Output Would You Prefer (Optional)?: Bullet Format
How Severe Are Your Spot(S)?: moderate
Have Your Spot(S) Been Treated In The Past?: has been treated
Hpi Title: Evaluation of Skin Lesions
When Was It Treated?: 2019

## 2021-04-06 ENCOUNTER — APPOINTMENT (RX ONLY)
Dept: URBAN - METROPOLITAN AREA CLINIC 167 | Facility: CLINIC | Age: 81
Setting detail: DERMATOLOGY
End: 2021-04-06

## 2021-04-06 DIAGNOSIS — D22 MELANOCYTIC NEVI: ICD-10-CM

## 2021-04-06 DIAGNOSIS — D18.0 HEMANGIOMA: ICD-10-CM

## 2021-04-06 DIAGNOSIS — K13.0 DISEASES OF LIPS: ICD-10-CM

## 2021-04-06 DIAGNOSIS — Z85.828 PERSONAL HISTORY OF OTHER MALIGNANT NEOPLASM OF SKIN: ICD-10-CM

## 2021-04-06 DIAGNOSIS — L81.4 OTHER MELANIN HYPERPIGMENTATION: ICD-10-CM

## 2021-04-06 DIAGNOSIS — L82.1 OTHER SEBORRHEIC KERATOSIS: ICD-10-CM

## 2021-04-06 PROBLEM — D22.5 MELANOCYTIC NEVI OF TRUNK: Status: ACTIVE | Noted: 2021-04-06

## 2021-04-06 PROBLEM — D18.01 HEMANGIOMA OF SKIN AND SUBCUTANEOUS TISSUE: Status: ACTIVE | Noted: 2021-04-06

## 2021-04-06 PROCEDURE — ? COUNSELING

## 2021-04-06 PROCEDURE — 99213 OFFICE O/P EST LOW 20 MIN: CPT

## 2021-04-06 PROCEDURE — ? PRESCRIPTION

## 2021-04-06 PROCEDURE — ? TREATMENT REGIMEN

## 2021-04-06 RX ORDER — KETOCONAZOLE 20 MG/G
CREAM TOPICAL
Qty: 1 | Refills: 0 | Status: ERX | COMMUNITY
Start: 2021-04-06

## 2021-04-06 RX ORDER — HYDROCORTISONE 25 MG/G
OINTMENT TOPICAL
Qty: 1 | Refills: 0 | Status: ERX | COMMUNITY
Start: 2021-04-06

## 2021-04-06 RX ADMIN — KETOCONAZOLE: 20 CREAM TOPICAL at 00:00

## 2021-04-06 RX ADMIN — HYDROCORTISONE: 25 OINTMENT TOPICAL at 00:00

## 2021-04-06 ASSESSMENT — LOCATION ZONE DERM
LOCATION ZONE: ARM
LOCATION ZONE: LEG
LOCATION ZONE: NECK
LOCATION ZONE: TRUNK
LOCATION ZONE: FACE

## 2021-04-06 ASSESSMENT — LOCATION SIMPLE DESCRIPTION DERM
LOCATION SIMPLE: POSTERIOR NECK
LOCATION SIMPLE: RIGHT PRETIBIAL REGION
LOCATION SIMPLE: ABDOMEN
LOCATION SIMPLE: RIGHT FOREARM
LOCATION SIMPLE: CHEST
LOCATION SIMPLE: LEFT PRETIBIAL REGION
LOCATION SIMPLE: LEFT FOREARM
LOCATION SIMPLE: LEFT THIGH
LOCATION SIMPLE: RIGHT SHOULDER
LOCATION SIMPLE: UPPER BACK
LOCATION SIMPLE: LEFT CHEEK
LOCATION SIMPLE: RIGHT THIGH
LOCATION SIMPLE: LOWER BACK
LOCATION SIMPLE: LEFT SHOULDER
LOCATION SIMPLE: LEFT UPPER ARM
LOCATION SIMPLE: RIGHT UPPER ARM

## 2021-04-06 ASSESSMENT — LOCATION DETAILED DESCRIPTION DERM
LOCATION DETAILED: LEFT PROXIMAL PRETIBIAL REGION
LOCATION DETAILED: LEFT ANTERIOR DISTAL THIGH
LOCATION DETAILED: RIGHT PROXIMAL PRETIBIAL REGION
LOCATION DETAILED: SUPERIOR THORACIC SPINE
LOCATION DETAILED: PERIUMBILICAL SKIN
LOCATION DETAILED: LEFT DISTAL RADIAL DORSAL FOREARM
LOCATION DETAILED: LEFT POSTERIOR SHOULDER
LOCATION DETAILED: MID POSTERIOR NECK
LOCATION DETAILED: SUPERIOR LUMBAR SPINE
LOCATION DETAILED: LEFT CENTRAL MALAR CHEEK
LOCATION DETAILED: RIGHT DISTAL POSTERIOR UPPER ARM
LOCATION DETAILED: STERNUM
LOCATION DETAILED: LEFT DISTAL POSTERIOR UPPER ARM
LOCATION DETAILED: INFERIOR THORACIC SPINE
LOCATION DETAILED: RIGHT DISTAL DORSAL FOREARM
LOCATION DETAILED: RIGHT ANTERIOR DISTAL THIGH
LOCATION DETAILED: RIGHT POSTERIOR SHOULDER

## 2021-04-06 NOTE — PROCEDURE: TREATMENT REGIMEN
Detail Level: Detailed
Otc Regimen: Desitin apply nightly on corners of mouth.
Initiate Treatment: hydrocortisone 2.5 % topical ointment: Apply to mouth corners 3-5 times a day for seven days then as needed for flares. Mix with ketoconazole\\n\\nketoconazole 2 % topical cream: Apply to mouth corners 3-5 times a day for seven days then as needed for flares. Mix with hydrocortisone

## 2021-08-06 ENCOUNTER — APPOINTMENT (RX ONLY)
Dept: URBAN - METROPOLITAN AREA CLINIC 170 | Facility: CLINIC | Age: 81
Setting detail: DERMATOLOGY
End: 2021-08-06

## 2021-08-06 DIAGNOSIS — L72.0 EPIDERMAL CYST: ICD-10-CM

## 2021-08-06 PROCEDURE — ? INCISION AND DRAINAGE

## 2021-08-06 PROCEDURE — 10060 I&D ABSCESS SIMPLE/SINGLE: CPT

## 2021-08-06 PROCEDURE — ? ORDER TESTS

## 2021-08-06 PROCEDURE — ? COUNSELING

## 2021-08-06 ASSESSMENT — LOCATION DETAILED DESCRIPTION DERM: LOCATION DETAILED: RIGHT SUPRAPUBIC REGION

## 2021-08-06 ASSESSMENT — LOCATION ZONE DERM: LOCATION ZONE: TRUNK

## 2021-08-06 ASSESSMENT — LOCATION SIMPLE DESCRIPTION DERM: LOCATION SIMPLE: RIGHT SUPRAPUBIC REGION

## 2021-08-06 NOTE — PROCEDURE: INCISION AND DRAINAGE
Detail Level: Simple
Lesion Type: Cyst
Method: sterile needle
Curette: No
Anesthesia Type: 1% lidocaine with epinephrine
Anesthesia Volume In Cc: 1
Size Of Lesion In Cm (Optional But May Be Required For Some Insurances): 0
Wound Care: Vaseline
Dressing: dry sterile dressing
Epidermal Sutures: 4-0 Ethilon
Epidermal Closure: simple interrupted
Suture Text: The incision was partially closed with
Preparation Text: The area was prepped in the usual clean fashion.
Consent was obtained and risks were reviewed including but not limited to delayed wound healing, infection, need for multiple I and D's, and pain.
Post-Care Instructions: I reviewed with the patient in detail post-care instructions. Patient should keep wound covered and call the office should any redness, pain, swelling or worsening occur.

## 2021-08-06 NOTE — PROCEDURE: ORDER TESTS
Performing Laboratory: [id number]
Billing Type: Third-Party Bill
Expected Date Of Service: 08/06/2021
Bill For Surgical Tray: no

## 2021-09-09 ENCOUNTER — OFFICE VISIT (OUTPATIENT)
Dept: URBAN - METROPOLITAN AREA CLINIC 13 | Facility: CLINIC | Age: 81
End: 2021-09-09
Payer: MEDICARE

## 2021-09-09 DIAGNOSIS — H35.371 PUCKERING OF MACULA, RIGHT EYE: Primary | ICD-10-CM

## 2021-09-09 DIAGNOSIS — H31.091 OTHER CHORIORETINAL SCARS, RIGHT EYE: ICD-10-CM

## 2021-09-09 DIAGNOSIS — H43.813 VITREOUS DEGENERATION, BILATERAL: ICD-10-CM

## 2021-09-09 DIAGNOSIS — Z96.1 PRESENCE OF INTRAOCULAR LENS: ICD-10-CM

## 2021-09-09 PROCEDURE — 99214 OFFICE O/P EST MOD 30 MIN: CPT | Performed by: OPHTHALMOLOGY

## 2021-09-09 PROCEDURE — 92134 CPTRZ OPH DX IMG PST SGM RTA: CPT | Performed by: OPHTHALMOLOGY

## 2021-09-09 ASSESSMENT — INTRAOCULAR PRESSURE
OS: 9
OD: 8

## 2021-09-09 NOTE — IMPRESSION/PLAN
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
s/p Laser x RT at 10 o'clock Plan: Stable. Obs.  RDW. Minimal ERM. [facility]  Liver Transplant  Progress Note    Patient Name: Scarlett Reddy  MRN: [id number]  Admission Date: 10/1/2018  Hospital Length of Stay: 56 days  Code Status: Full Code  Primary Care Provider: Primary Doctor No  Post-Operative Day: 53    ORGAN:   LIVER  Disease Etiology: Cirrhosis: Type B and D  Donor Type:    - Brain Death  CDC High Risk:   No  Donor CMV Status:   Donor CMV Status: Positive  Donor HBcAB:   Negative  Donor HCV Status:   Negative  Donor HBV ESTH: Negative  Donor HCV SETH: Negative  Whole or Partial: Whole Liver  Biliary Anastomosis: End to End  Arterial Anatomy: Replaced Left Hepatic and Right Hepatic  Subjective:     History of Present Illness:  Ms. Reddy is a 72 y/o female with PMH of ESLD secondary Hep B and D.  Listed for liver transplant with MELD 23.  Paracentesis 10/1 with 5L removed, no fluid sent for analysis.  Morning labs significant for hyponatremia, Na 119.  Pt admitted to LTS for sodium monitoring.      Hospital Course: 72 y/o F h/o HBV/delta cirrhosis s/p DBDLT 10/5/18 (CMV D+/R+, steroid induction, MMF/tacro/pred maintenance) with take back on 10/6 2/2 hyperbilirubinema and bilious VALORIE drainage, no leak identified. Post-op course c/b hypercapneic and hypoxic respiratory failure and was reintubated though quickly extubated now doing well. Liver bx (10/6) sig for Zone 3 hemorrhage and collapse, in addition to cholestasis. Transferred from ICU on POD #4. ID consulted to comment on positive diphtheroid culture from ascitic fluid (likely skin colonization) as well as ESBL Klebsiella pneumoniae in urine culture (10/4).  Pt asymptomatic and cell count from ascitic fluid unremarkable. Per ID recs, no need to treat diphtheroids or asymptomatic ESBL Kleb pneumo bacteriuria though pt has had 6 days of therapy which would cover these organisms. Mild peritransplant ascites- repeat US 10/6 with increased arterial resistive indices. Repeat US 10/9 w/ continued  elevation in RIs and fluid collections. LFTs trending down nicely.     Acute gradual worsening of renal function s/p OLTx. Creatinine on arrival 0.8 and has been up trending. Nephrology consulted for post-op EVA. Cr cont to trend up but remains to have good UOP. Had stable response to lasiz diuresis.  BUN elevated requiring multiple rounds of dialysis (10/15, 10/17, 10/20, 10/31). No significant improvements in kidney function noted.   In addition, pt H/H cont to fluctuate requiring multiple blood transfusions (10/14, 10/16, 10/18, 10/19, 10/22, 10/27, 10/29).  Pt reported dark stools 10/15 but color has normalized. FOBT+. EGD 10/18 consistent with ulcerated mucosa in duodenum s/p coagulation. Concern for H. Pylori. Started Amoxicillin/levofloxacin (10/19, complete 11/2)). Remains on Protonix 40 mg BID. Biopsy negative for infection and CMV. Will cont close monitoring H/H, transfuse as needed with goal Hb > 7.0. Of note, Urine cx + Klebsiella pneumoniae (10/13). Received 3 day course of ciprofloxacin, dc'd 10/19.   On 10/19 pm pt c/o crushing chest pain. Troponin 0.094, likely 2/2 ischemic stress. EKG NSR.   Liver US 11/6 showed no arterial flow. Paracentesis 11/7 with 8.9 L removed. Wbc 39, segs 16%. Gram stain no organisms seen + no WBC. Pt feels better since having fluid removed but she continues to have worsening abdominal distention & LE edema.Repeat Liver US 11/8 showed arterial flow with increased RIs. Paracentesis 11/12 with 8.6L removed (WBC 34, segs 25%). 11/15 liver u/s showed abnormally decreased main hepatic artery peak systolic velocity with slow arterial flow and abnormal tardus parvus waveforms intrahepatically. Consulted vascular surgery. Repeat liver US 11/19 showed no arterial flow on R and sluggish flow on L. Hepatic angiogram 11/19 confirmed HAT.  Friday 11/16, purulent drainage noted from middle of chevron incision. Area opened and packed. Cx grew klebsiella p. CT scan showed no signs of  infection. On Monday 11/18, it was noted that the right/lateral side of the chevron had 3 small openings draining purulent fluid. Opened the area and packed. Wound care following.    Interval history:  No acute events overnight, but pt reports poor sleep due to abd/back pain.  Encouraged pt to take Tylenol before bed to help w/ pain. ( yesterday pt did not want to take pain meds due to hst of poor rxn to oxy but after discussion w/ team, she agreed to take tylenol). Induration + erythema  RLQ under chevron, slightly improved today. VSS. CT abd/pelvis pending 11/26 showed large R pleural effusion, moderate L effusion, and no acute changes or concern for infection. Will give additional 60 mg of Lasix today for a total of 100 mg due to cont edema, weeping, and effusions. Wound care to see pt today to replace wd vac to incision (middle portion and right end). Drainage purulent. Pt on day 5 of 7 of Ertapenem for kleb pneumo ESBL UTI.  Of note, pt grew same bacteria from wound 11/16.  She is tolerating diet.  Denies diarrhea.  Ambulating w/o difficulty. Nurse reviewing meds with pt and  today as pt and her  have not yet pulled self meds. Continue to monitor. Possible d/c later this week.    Scheduled Meds:   aspirin  81 mg Oral Daily    atovaquone  1,500 mg Oral Daily    entecavir  0.5 mg Oral Daily    ertapenem (INVANZ) IVPB  1 g Intravenous Q24H    furosemide  40 mg Oral Daily    furosemide  60 mg Oral BID    heparin (porcine)  5,000 Units Subcutaneous Q8H    insulin aspart U-100  7-14 Units Subcutaneous TIDWM    k phos di & mono-sod phos mono  2 tablet Oral BID    magnesium oxide  800 mg Oral BID    midodrine  15 mg Oral TID    pantoprazole  40 mg Oral BID    predniSONE  5 mg Oral Daily    Followed by    [START ON 12/1/2018] predniSONE  2.5 mg Oral Daily    tacrolimus  1 mg Oral BID    ursodiol  300 mg Oral BID     Continuous Infusions:  PRN Meds:acetaminophen, bisacodyl, calcium  carbonate, dextrose 50%, dextrose 50%, glucagon (human recombinant), glucose, glucose, insulin aspart U-100, omnipaque, ondansetron, ondansetron, polyethylene glycol, sodium chloride 0.9%, traMADol    Review of Systems   Constitutional: Positive for activity change and appetite change (improving). Negative for chills, fatigue and fever.   HENT: Negative for congestion, facial swelling and trouble swallowing.    Eyes: Negative for pain, discharge and visual disturbance.   Respiratory: Negative for cough, chest tightness, shortness of breath and wheezing.    Cardiovascular: Positive for leg swelling. Negative for chest pain and palpitations.   Gastrointestinal: Positive for abdominal distention and abdominal pain. Negative for constipation, diarrhea, nausea and vomiting.   Endocrine: Negative.    Genitourinary: Negative for decreased urine volume, difficulty urinating and dysuria.   Musculoskeletal: Positive for back pain. Negative for arthralgias, neck pain and neck stiffness.   Skin: Positive for wound.   Allergic/Immunologic: Positive for immunocompromised state.   Neurological: Positive for weakness. Negative for tremors and headaches.   Psychiatric/Behavioral: Negative for confusion and sleep disturbance.     Objective:     Vital Signs (Most Recent):  Temp: 98.4 °F (36.9 °C) (11/27/18 0832)  Pulse: (!) 119 (11/27/18 1004)  Resp: 19 (11/27/18 1004)  BP: 133/75 (11/27/18 1004)  SpO2: 98 % (11/27/18 1004) Vital Signs (24h Range):  Temp:  [97.9 °F (36.6 °C)-98.4 °F (36.9 °C)] 98.4 °F (36.9 °C)  Pulse:  [] 119  Resp:  [13-19] 19  SpO2:  [98 %-100 %] 98 %  BP: (103-137)/(60-81) 133/75     Weight: 60 kg (132 lb 4.4 oz)  Body mass index is 23.43 kg/m².    Intake/Output - Last 3 Shifts       11/25 0700 - 11/26 0659 11/26 0700 - 11/27 0659 11/27 0700 - 11/28 0659    P.O. 1000 1000     I.V. (mL/kg)  100 (1.7)     IV Piggyback  200     Total Intake(mL/kg) 1000 (16.7) 1300 (21.7)     Urine (mL/kg/hr) 0 (0)      Other  150      Stool 0      Total Output 150      Net +850 +1300            Urine Occurrence 4 x 7 x     Stool Occurrence 3 x 3 x     Emesis Occurrence  0 x           Physical Exam   Constitutional: She is oriented to person, place, and time. She appears well-developed. No distress.   Temporal and distal extremity muscle wasting   HENT:   Head: Normocephalic and atraumatic.   Eyes: EOM are normal. No scleral icterus.   Neck: Normal range of motion.   Cardiovascular: Normal rate, regular rhythm and normal heart sounds.   No murmur heard.  Pulmonary/Chest: Effort normal. No respiratory distress. She has no wheezes.   Breath sounds decreased at bases   Abdominal: Soft. Bowel sounds are normal. She exhibits distension. There is tenderness. There is no guarding.   Purulent drainage expressed from middle chevron incision + R lateral side, both areas packed w/ wound vac to be replaced today  Erythema + induration RLQ, improving  RLQ VALORIE drain sites c/d/i with sutures   Musculoskeletal: Normal range of motion. She exhibits edema (2+ BLE, + generalized).   Neurological: She is alert and oriented to person, place, and time.   Skin: Skin is warm. She is not diaphoretic.   Psychiatric: She has a normal mood and affect.   Nursing note and vitals reviewed.      Laboratory:  Immunosuppressants         Stop Route Frequency     tacrolimus capsule 1 mg      -- Oral 2 times daily        CBC:   Recent Labs   Lab 11/27/18  0655   WBC 3.59*   RBC 2.61*   HGB 7.6*   HCT 24.0*   PLT 98*   MCV 92   MCH 29.1   MCHC 31.7*     CMP:   Recent Labs   Lab 11/27/18  0655   *   CALCIUM 8.2*   ALBUMIN 2.6*   PROT 4.6*      K 4.0   CO2 30*      BUN 54*   CREATININE 1.1   ALKPHOS 115   ALT 8*   AST 15   BILITOT 1.1*     Labs within the past 24 hours have been reviewed.    Diagnostic Results:  I have personally reviewed all pertinent imaging studies.Scheduled Meds:   aspirin  81 mg Oral Daily    atovaquone  1,500 mg Oral Daily     entecavir  0.5 mg Oral Daily    ertapenem (INVANZ) IVPB  1 g Intravenous Q24H    furosemide  40 mg Oral Daily    furosemide  60 mg Oral BID    heparin (porcine)  5,000 Units Subcutaneous Q8H    insulin aspart U-100  7-14 Units Subcutaneous TIDWM    k phos di & mono-sod phos mono  2 tablet Oral BID    magnesium oxide  800 mg Oral BID    midodrine  15 mg Oral TID    pantoprazole  40 mg Oral BID    predniSONE  5 mg Oral Daily    Followed by    [START ON 12/1/2018] predniSONE  2.5 mg Oral Daily    tacrolimus  1 mg Oral BID    ursodiol  300 mg Oral BID     Continuous Infusions:  PRN Meds:acetaminophen, bisacodyl, calcium carbonate, dextrose 50%, dextrose 50%, glucagon (human recombinant), glucose, glucose, insulin aspart U-100, omnipaque, ondansetron, ondansetron, polyethylene glycol, sodium chloride 0.9%, traMADol    Review of Systems  Objective:     Vital Signs (Most Recent):  Temp: 98.4 °F (36.9 °C) (11/27/18 0832)  Pulse: (!) 119 (11/27/18 1004)  Resp: 19 (11/27/18 1004)  BP: 133/75 (11/27/18 1004)  SpO2: 98 % (11/27/18 1004) Vital Signs (24h Range):  Temp:  [97.9 °F (36.6 °C)-98.4 °F (36.9 °C)] 98.4 °F (36.9 °C)  Pulse:  [] 119  Resp:  [13-19] 19  SpO2:  [98 %-100 %] 98 %  BP: (103-137)/(60-81) 133/75     Weight: 60 kg (132 lb 4.4 oz)  Body mass index is 23.43 kg/m².    Intake/Output - Last 3 Shifts       11/25 0700 - 11/26 0659 11/26 0700 - 11/27 0659 11/27 0700 - 11/28 0659    P.O. 1000 1000     I.V. (mL/kg)  100 (1.7)     IV Piggyback  200     Total Intake(mL/kg) 1000 (16.7) 1300 (21.7)     Urine (mL/kg/hr) 0 (0)      Other 150      Stool 0      Total Output 150      Net +850 +1300            Urine Occurrence 4 x 7 x     Stool Occurrence 3 x 3 x     Emesis Occurrence  0 x           Physical Exam    Laboratory:  Immunosuppressants         Stop Route Frequency     tacrolimus capsule 1 mg      -- Oral 2 times daily        CBC:   Recent Labs   Lab 11/27/18  0655   WBC 3.59*   RBC 2.61*   HGB 7.6*  "  HCT 24.0*   PLT 98*   MCV 92   MCH 29.1   MCHC 31.7*     CMP:   Recent Labs   Lab 11/27/18  0655   *   CALCIUM 8.2*   ALBUMIN 2.6*   PROT 4.6*      K 4.0   CO2 30*      BUN 54*   CREATININE 1.1   ALKPHOS 115   ALT 8*   AST 15   BILITOT 1.1*     Coagulation: No results for input(s): PT, INR, APTT in the last 168 hours.  Labs within the past 24 hours have been reviewed.    Diagnostic Results:  I have personally reviewed all pertinent imaging studies.    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   - POD 1 US: increased arterial resistive indices, suggestive of edema vs acute rejection vs congestion.  - Repeat US 10/9 with continued elevation of RIs.  - LFTs stable.  - T bili trending down.   - Repeat liver US (10/11): elevated RIs.  - Liver US 11/6 to assess for ascites. No arterial flow within the liver allograft concerning for interval thrombosis/occlusion of the arterial system. Severe ascites seen.  - Liver US 11/8: arterial flow seen w/ elevated RIs   - LFTs remain stable. No CTA at this time due to EVA and normal liver function.   - Liver US 11/15 showed "abnormally decreased main hepatic artery peak systolic velocity with slow arterial flow and abnormal tardus parvus waveforms intrahepatically."   -Vascular surgery consulted  - Liver US 11/19 showed no arterial flow on the right and sluggish flow on the left.   - Angiogram 11/20 confirmed hepatic arterial thrombosis. No further intervention planned at this time as patient's LFTs are stable. Will monitor.  - CT abd/pelvis 11/26 showed: 1. Persistent large volume of ascites. 2. Large right and moderate left pleural effusions with adjacent basilar atelectasis. 3. Anasarca         Pleural effusion    - Common post liver transplant  - CT 11/26 showed: Large right and " "moderate left pleural effusions with adjacent basilar atelectasis.  - Pt sp02 stable on RA, but c/o some orthopnea.  - Continue w/ diuresis.       Chronic kidney disease, stage III (moderate)    - Kidney fx improving  - Monitor       Hypomagnesemia    - Continue PO Mag Oxide  - Continue to monitor.       Hypokalemia    - Replace as needed.   - Continue to monitor.     Hypophosphatemia    - Continue k phos and monitor closely.       Delayed surgical wound healing    - Middle chevron incision opened 11/16 with purulent drainage  - Packed with Aquacel ag at this time. Growing Klebsiella p.   - CT abd/pelvis 11/17- no signs of infection  - 11/17: R side of chevron incision w/ 3 small holes draining purulent fluid, opened and packed w/ aquacel.   - Wound care following.  - Wound w/ increased drainage, so wound care placed vac 11/24/18.  Apprec recs. Extending Ertapenem thru 11/28 per ID.    - Abdomen erythematous and indurated RLQ inferior to chevron.  - CT abd/pelvis 11/26 - no sign of infection    .       Hepatic artery thrombosis of transplanted liver    - Liver US 11/19 showed no arterial flow to R and sluggish flow to L  - Hepatic angiogram 11/19 confirmed HAT  - No further intervention planned at this time. Will continue to monitor.     Drug-induced pancytopenia    - Decreased plt, wbc, h/h  - See "leukopenia"  - Continue to monitor.     Thrombocytopenia, unspecified    - Improving.  - Monitor.       Alteration in skin integrity    - Monitor.       Leukopenia    - Stopped Cellcept, Bactrim, and Valcyte 10/31   - Cellcept half dose resumed 11/6  - Started Atovaquone 11/6.   - CMV PCR noted to be positive at 129. Will hold off on treatment with valcyte at this time. Monitoring weekly CMV PCRs.  - Cellcept held 11/23 2/2 infection (UTI + wound)  - Continue to monitor.       Azotemia    - CRRT 10/31 for uremic toxin clearance.           UTI (urinary tract infection)    - Urine Cx 10/15 w/ Klebsiella pneumonia.  - " "Started Ciprofloxacin (10/17-10/19).   - Started on amoxicillin/levo for H. Pylori (completed abx 11/2).  - Pt c/o lower back pain 11/22. UA w/ trace leuks, moderate wbs, 1+ blood, cx pending  - Pt symptomatic (back pain) & with ESBL in wound previously.  Ertapenem 1g QD x 3 days (started 11/23), per ID recs.  Given pt cont to have back pain and concern for infection in wound ( thick drainage and increased drainage)-  extended Ertapenem for 4 more days per ID (stop date 11/28/18).         Acute blood loss anemia    - Expected post transplant  - H&H cont to fluctuate.   - FOBT +.  - EGD 10/18 - showed diffuse bleeding with ulcerated duodenal mucosa.  - EGD 11/5 - improvement seen, no active bleeding.  - Resume heparin sq injections 11/6.   - Decrease heparin sq from TID to BID 11/15   - Heparin dc'd 11/17  - See "anemia requiring transfusions."  - Continue to monitor.             Post LT - Acute renal failure    - Creatinine on arrival 0.8 and trended up.  - Nephrology consulted.  - Renal US (10/11): no hydronephrosis.  - Echo 10/12: diastolic dysfunction, likely 2/2 to ARF.  - CRRT 10/14; SLED 10/17, 10/20. --> no improvements noted in kidney fxn.  - 24 hr urine creatine collection: CrCl of 7.   - CRRT 10/31 with 1L removed.   - See "hypervolemia associated with renal insufficiency"     Hypervolemia associated with renal insufficiency    - CRRT 10/14, 10/17, 10/20, 10/31.  - CXR 10/30: abundant fluid in b/l pleural space.  - Will give extra 60 mg of Lasix 11/27 for a total of 100 mg due signs of worsening edema (increased abd weeping, pleural effusions on CT)  - Monitor daily weights.       EVA (acute kidney injury)    - No plans for RRT in near future, per nephrology  - Trialysis line removed 11/13  - Nephrology involved. Appreciate recs.  - Cr improving daily.   Monitor.  - Decreased lasix from 80 mg to 40 mg 11/25.   - Will give extra 60 mg of Lasix 11/27 for a total of 100 mg (increased abd weeping, pleural " effusions on CT)  - Continue to monitor.     At risk for opportunistic infections    - Valcyte for CMV prophylaxis--> holding (10/31) for leukopenia. Pt will need weekly CMV PCR while discontinued.  - CMV PCR noted to be positive at 129. .  - CMV PCR 11/8: 105  - CMV PCR 11/15 with 89 copies  - CMV PCR 11/23 95 - increased from previous. Continue to hold Valcyte & monitor pt closely.  - Bactrim for PCP prophylaxis ([facility]).--> holding (10/31) for leukopenia.   - Start Atovaquone 11/6.  - Fluconazole for fungal prophylaxis (first dose 10/6). D/c Fluc 11/16 bc pt had been on it for > 30 days post take back.       Long-term use of immunosuppressant medication    - Prograf: stopped 10/29. Resumed 11/2.  --> Will monitor for signs of toxic side effects, check daily troughs, and change meds accordingly.   - Prednisone: stopped 10/29. Started on Hydrocortisone. pred taper resumed.  - Cellcept: stopped 10/31 for leukopenia. Restarted Cellcept 500 mg bid, 11/6.  - Cellcept held 11/23 2/2 infection         Prophylactic immunotherapy    - See long term use immunosuppresion.       Ascites    - Paracentesis 10/1, 5L removed, no fluid sent for analysis.  - Pt remains volume overloaded.   - Liver US 11/6 with severe ascites.   - Paracentesis 11/7 with 8.9 L removed. Wbc 39, segs 16%. Gram stain - no organisms seen and no WBC. Cultures NGTD.  - Paracentesis 11/12: 8.6 L removed. 34 wbc, 25% segs, 33 lymphs, 42 monocytes/macrophages. Cultures NGTD  - Paracentesis 11/21 with 4 L removed, neg for infection.     Weakness    - See physical deconditioning.       Physical deconditioning    - PT/OT consulted.  Progressing well w therapy.  - Recommend home health PT and rolling walker at discharge (orders placed 11/6).       Hypotension    - continue Midodrine 15 mg TID. Monitor       Anemia requiring transfusions    - Fluctuating H&H.    - Transfusions: 10/14; 10/16; 10/18; 10/19; 10/22; 10/27; 10/29, 10/31, 11/3, 11/21  - FOBT +  - LDH  elevated, Hapto < 10.  - EGD 10/18: ulcerated duodenal mucosa.   - Consulted GI about this ongoing issue and further need of another EGD.  - EGD 11/5 unremarkable.   - Iron studies 11/13:  95, TIBC 107, sat iron 89, transferrin 72, ferritin pending  - Hemolytic anemia workup 11/13: Haptoglobin 132, retic 3.9   - Last transfused 1upRBC 11/21. H/H now improved.   - H/H decreased to 7.6/24. Ordered type and screen, but no transfusion planned for today.   - Continue to monitor.           Severe malnutrition    - Dietary consulted.   - Temporal and distal extremity muscle wasting and edema.  - Encourage supplements and to increase nutritional intake.  - Per nephrology, pt to be on low protein diet.  - Prealbumin reviewed.   - Tolerating diet.  Denies n/v.    - Renal fx improved and stable. Told pt it's ok to resume outside protein shakes, but will monitor closely.     Type 2 diabetes mellitus with diabetic polyneuropathy, with long-term current use of insulin    - Continue home regimen.  - Endocrine consulted.  - Pt off insulin drip at this time.  Apprec endo recs.        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  - Tx w/ Entecavir- dose changed to daily 11/26 due to improved renal fx             VTE Risk Mitigation (From admission, onward)        Ordered     heparin (porcine) injection 5,000 Units  Every 8 hours      11/22/18 1014     IP VTE HIGH RISK PATIENT  Once      11/05/18 1145     Place sequential compression device  Until discontinued      10/05/18 0709          The patients clinical status was discussed at multidisplinary rounds, involving transplant surgery, transplant medicine, pharmacy, nursing, nutrition, and social work    Discharge Planning:  No Patient Care Coordination Note on file.      Elizabeth Dean PA-C  Liver Transplant  [facility]

## 2021-09-09 NOTE — IMPRESSION/PLAN
Impression: Puckering of macula, right eye: H35.371. OCT= min ERM, no SRF/IRF OU with  / 282 Plan: Very early and stable. Good contour There is an epiretinal membrane (ERM), but the patient is doing well with their current vision, and thus we will observe the ERM for now. We did discuss the natural history as well as the risks and benefits of observation vs. vitrectomy surgery. The patient will call if they experience decreased vision or increased metamorphopsia. 1-2 years OCT OU x early ERM

## 2021-10-22 ENCOUNTER — APPOINTMENT (RX ONLY)
Dept: URBAN - METROPOLITAN AREA CLINIC 170 | Facility: CLINIC | Age: 81
Setting detail: DERMATOLOGY
End: 2021-10-22

## 2021-10-22 DIAGNOSIS — L21.8 OTHER SEBORRHEIC DERMATITIS: ICD-10-CM

## 2021-10-22 DIAGNOSIS — D18.0 HEMANGIOMA: ICD-10-CM

## 2021-10-22 DIAGNOSIS — Z85.828 PERSONAL HISTORY OF OTHER MALIGNANT NEOPLASM OF SKIN: ICD-10-CM

## 2021-10-22 DIAGNOSIS — D22 MELANOCYTIC NEVI: ICD-10-CM

## 2021-10-22 DIAGNOSIS — L82.1 OTHER SEBORRHEIC KERATOSIS: ICD-10-CM

## 2021-10-22 DIAGNOSIS — L81.4 OTHER MELANIN HYPERPIGMENTATION: ICD-10-CM

## 2021-10-22 PROBLEM — D18.01 HEMANGIOMA OF SKIN AND SUBCUTANEOUS TISSUE: Status: ACTIVE | Noted: 2021-10-22

## 2021-10-22 PROBLEM — D22.5 MELANOCYTIC NEVI OF TRUNK: Status: ACTIVE | Noted: 2021-10-22

## 2021-10-22 PROCEDURE — ? COUNSELING

## 2021-10-22 PROCEDURE — 99213 OFFICE O/P EST LOW 20 MIN: CPT

## 2021-10-22 PROCEDURE — ? PRESCRIPTION

## 2021-10-22 RX ORDER — HYDROCORTISONE 25 MG/G
CREAM TOPICAL
Qty: 28 | Refills: 2 | Status: ERX | COMMUNITY
Start: 2021-10-22

## 2021-10-22 RX ORDER — KETOCONAZOLE 20 MG/G
CREAM TOPICAL
Qty: 60 | Refills: 3 | Status: ERX | COMMUNITY
Start: 2021-10-22

## 2021-10-22 RX ADMIN — KETOCONAZOLE: 20 CREAM TOPICAL at 00:00

## 2021-10-22 RX ADMIN — HYDROCORTISONE: 25 CREAM TOPICAL at 00:00

## 2021-10-22 ASSESSMENT — LOCATION SIMPLE DESCRIPTION DERM
LOCATION SIMPLE: RIGHT FOREARM
LOCATION SIMPLE: ABDOMEN
LOCATION SIMPLE: UPPER BACK
LOCATION SIMPLE: CHEST
LOCATION SIMPLE: LEFT THIGH
LOCATION SIMPLE: LEFT FOREARM
LOCATION SIMPLE: RIGHT SHOULDER
LOCATION SIMPLE: RIGHT PRETIBIAL REGION
LOCATION SIMPLE: LEFT UPPER ARM
LOCATION SIMPLE: LEFT PRETIBIAL REGION
LOCATION SIMPLE: POSTERIOR NECK
LOCATION SIMPLE: LOWER BACK
LOCATION SIMPLE: LEFT SHOULDER
LOCATION SIMPLE: RIGHT UPPER ARM
LOCATION SIMPLE: ANTERIOR SCALP
LOCATION SIMPLE: RIGHT THIGH
LOCATION SIMPLE: LEFT CHEEK

## 2021-10-22 ASSESSMENT — LOCATION DETAILED DESCRIPTION DERM
LOCATION DETAILED: STERNUM
LOCATION DETAILED: RIGHT DISTAL POSTERIOR UPPER ARM
LOCATION DETAILED: LEFT POSTERIOR SHOULDER
LOCATION DETAILED: RIGHT ANTERIOR DISTAL THIGH
LOCATION DETAILED: PERIUMBILICAL SKIN
LOCATION DETAILED: RIGHT POSTERIOR SHOULDER
LOCATION DETAILED: RIGHT ANTERIOR PROXIMAL THIGH
LOCATION DETAILED: LEFT DISTAL POSTERIOR UPPER ARM
LOCATION DETAILED: MID-FRONTAL SCALP
LOCATION DETAILED: LEFT CENTRAL MALAR CHEEK
LOCATION DETAILED: LEFT DISTAL RADIAL DORSAL FOREARM
LOCATION DETAILED: MID POSTERIOR NECK
LOCATION DETAILED: LEFT PROXIMAL PRETIBIAL REGION
LOCATION DETAILED: SUPERIOR THORACIC SPINE
LOCATION DETAILED: RIGHT DISTAL DORSAL FOREARM
LOCATION DETAILED: LEFT ANTERIOR PROXIMAL THIGH
LOCATION DETAILED: SUPERIOR LUMBAR SPINE
LOCATION DETAILED: LEFT ANTERIOR DISTAL THIGH
LOCATION DETAILED: RIGHT PROXIMAL PRETIBIAL REGION
LOCATION DETAILED: INFERIOR THORACIC SPINE

## 2021-10-22 ASSESSMENT — LOCATION ZONE DERM
LOCATION ZONE: TRUNK
LOCATION ZONE: NECK
LOCATION ZONE: FACE
LOCATION ZONE: ARM
LOCATION ZONE: SCALP
LOCATION ZONE: LEG

## 2022-04-22 ENCOUNTER — APPOINTMENT (RX ONLY)
Dept: URBAN - METROPOLITAN AREA CLINIC 170 | Facility: CLINIC | Age: 82
Setting detail: DERMATOLOGY
End: 2022-04-22

## 2022-04-22 DIAGNOSIS — L81.4 OTHER MELANIN HYPERPIGMENTATION: ICD-10-CM

## 2022-04-22 DIAGNOSIS — Z85.828 PERSONAL HISTORY OF OTHER MALIGNANT NEOPLASM OF SKIN: ICD-10-CM

## 2022-04-22 DIAGNOSIS — L82.1 OTHER SEBORRHEIC KERATOSIS: ICD-10-CM

## 2022-04-22 DIAGNOSIS — D22 MELANOCYTIC NEVI: ICD-10-CM

## 2022-04-22 DIAGNOSIS — D18.0 HEMANGIOMA: ICD-10-CM

## 2022-04-22 PROBLEM — D18.01 HEMANGIOMA OF SKIN AND SUBCUTANEOUS TISSUE: Status: ACTIVE | Noted: 2022-04-22

## 2022-04-22 PROBLEM — D48.5 NEOPLASM OF UNCERTAIN BEHAVIOR OF SKIN: Status: ACTIVE | Noted: 2022-04-22

## 2022-04-22 PROBLEM — D22.5 MELANOCYTIC NEVI OF TRUNK: Status: ACTIVE | Noted: 2022-04-22

## 2022-04-22 PROCEDURE — 11102 TANGNTL BX SKIN SINGLE LES: CPT

## 2022-04-22 PROCEDURE — 99213 OFFICE O/P EST LOW 20 MIN: CPT | Mod: 25

## 2022-04-22 PROCEDURE — ? BIOPSY BY SHAVE METHOD

## 2022-04-22 PROCEDURE — ? COUNSELING

## 2022-04-22 ASSESSMENT — LOCATION DETAILED DESCRIPTION DERM
LOCATION DETAILED: RIGHT PROXIMAL PRETIBIAL REGION
LOCATION DETAILED: LEFT ANTERIOR DISTAL THIGH
LOCATION DETAILED: RIGHT DISTAL POSTERIOR UPPER ARM
LOCATION DETAILED: SUPERIOR LUMBAR SPINE
LOCATION DETAILED: LEFT DISTAL POSTERIOR UPPER ARM
LOCATION DETAILED: MID POSTERIOR NECK
LOCATION DETAILED: LEFT DISTAL RADIAL DORSAL FOREARM
LOCATION DETAILED: LEFT POSTERIOR SHOULDER
LOCATION DETAILED: PERIUMBILICAL SKIN
LOCATION DETAILED: LEFT PROXIMAL PRETIBIAL REGION
LOCATION DETAILED: SUPERIOR THORACIC SPINE
LOCATION DETAILED: LEFT CENTRAL MALAR CHEEK
LOCATION DETAILED: INFERIOR THORACIC SPINE
LOCATION DETAILED: RIGHT ANTERIOR DISTAL THIGH
LOCATION DETAILED: RIGHT POSTERIOR SHOULDER
LOCATION DETAILED: LEFT SUPERIOR UPPER BACK
LOCATION DETAILED: STERNUM
LOCATION DETAILED: RIGHT DISTAL DORSAL FOREARM

## 2022-04-22 ASSESSMENT — LOCATION SIMPLE DESCRIPTION DERM
LOCATION SIMPLE: LEFT THIGH
LOCATION SIMPLE: LEFT FOREARM
LOCATION SIMPLE: UPPER BACK
LOCATION SIMPLE: LOWER BACK
LOCATION SIMPLE: RIGHT SHOULDER
LOCATION SIMPLE: ABDOMEN
LOCATION SIMPLE: RIGHT FOREARM
LOCATION SIMPLE: LEFT UPPER BACK
LOCATION SIMPLE: RIGHT UPPER ARM
LOCATION SIMPLE: RIGHT THIGH
LOCATION SIMPLE: LEFT SHOULDER
LOCATION SIMPLE: CHEST
LOCATION SIMPLE: RIGHT PRETIBIAL REGION
LOCATION SIMPLE: POSTERIOR NECK
LOCATION SIMPLE: LEFT CHEEK
LOCATION SIMPLE: LEFT PRETIBIAL REGION
LOCATION SIMPLE: LEFT UPPER ARM

## 2022-04-22 ASSESSMENT — LOCATION ZONE DERM
LOCATION ZONE: ARM
LOCATION ZONE: NECK
LOCATION ZONE: LEG
LOCATION ZONE: FACE
LOCATION ZONE: TRUNK

## 2022-04-22 NOTE — PROCEDURE: BIOPSY BY SHAVE METHOD
Detail Level: Detailed
Depth Of Biopsy: dermis
Was A Bandage Applied: Yes
Size Of Lesion In Cm: 0.5
X Size Of Lesion In Cm: 0
Biopsy Type: H and E
Biopsy Method: double edge Personna blade
Hemostasis: Electrocautery
Wound Care: Vaseline
Dressing: bandage
Destruction After The Procedure: No
Type Of Destruction Used: Curettage
Cryotherapy Text: The wound bed was treated with cryotherapy after the biopsy was performed.
Electrodesiccation Text: The wound bed was treated with electrodesiccation after the biopsy was performed.
Electrodesiccation And Curettage Text: The wound bed was treated with electrodesiccation and curettage after the biopsy was performed.
Silver Nitrate Text: The wound bed was treated with silver nitrate after the biopsy was performed.
Lab: [id number]
Lab Facility: [id number]
Consent: Written consent was obtained and risks were reviewed including but not limited to scarring, infection, bleeding, scabbing, incomplete removal, nerve damage and allergy to anesthesia.
Post-Care Instructions: I reviewed with the patient in detail post-care instructions. Patient is to keep the biopsy site dry overnight, and then apply bacitracin twice daily until healed. Patient may apply hydrogen peroxide soaks to remove any crusting.
Notification Instructions: Patient will be notified of biopsy results. However, patient instructed to call the office if not contacted within 2 weeks.
Billing Type: Third-Party Bill
Information: Selecting Yes will display possible errors in your note based on the variables you have selected. This validation is only offered as a suggestion for you. PLEASE NOTE THAT THE VALIDATION TEXT WILL BE REMOVED WHEN YOU FINALIZE YOUR NOTE. IF YOU WANT TO FAX A PRELIMINARY NOTE YOU WILL NEED TO TOGGLE THIS TO 'NO' IF YOU DO NOT WANT IT IN YOUR FAXED NOTE.
no pelvic pain/no dysmenorrhea/no hematuria

## 2022-05-17 ENCOUNTER — APPOINTMENT (RX ONLY)
Dept: URBAN - METROPOLITAN AREA CLINIC 167 | Facility: CLINIC | Age: 82
Setting detail: DERMATOLOGY
End: 2022-05-17

## 2022-05-17 DIAGNOSIS — D22 MELANOCYTIC NEVI: ICD-10-CM

## 2022-05-17 PROBLEM — D48.5 NEOPLASM OF UNCERTAIN BEHAVIOR OF SKIN: Status: ACTIVE | Noted: 2022-05-17

## 2022-05-17 PROCEDURE — ? EXCISION

## 2022-05-17 PROCEDURE — 11402 EXC TR-EXT B9+MARG 1.1-2 CM: CPT

## 2022-05-17 PROCEDURE — 12032 INTMD RPR S/A/T/EXT 2.6-7.5: CPT

## 2022-05-17 ASSESSMENT — LOCATION ZONE DERM: LOCATION ZONE: TRUNK

## 2022-05-17 ASSESSMENT — LOCATION SIMPLE DESCRIPTION DERM: LOCATION SIMPLE: LEFT UPPER BACK

## 2022-05-17 ASSESSMENT — LOCATION DETAILED DESCRIPTION DERM: LOCATION DETAILED: LEFT SUPERIOR UPPER BACK

## 2022-05-17 NOTE — PROCEDURE: EXCISION
Medical Necessity Information: It is in your best interest to select a reason for this procedure from the list below. All of these items fulfill various CMS LCD requirements except lesion extends to a margin.
Include Z78.9 (Other Specified Conditions Influencing Health Status) As An Associated Diagnosis?: Yes
Add Nub Associated Diagnoses If Applicable When Selecting Medical Necessity: No
Medical Necessity Clause: This procedure was medically necessary because the lesion that was treated was:
Lab: [id number]
Lab Facility: [id number]
Size Of Lesion In Cm: 1
X Size Of Lesion In Cm (Optional): 0
Size Of Margin In Cm: 0.4
Anesthesia Volume In Cc: 5
Eye Clamp Note Details: An eye clamp was used during the procedure.
Excision Method: Elliptical
Saucerization Depth: dermis and superficial adipose tissue
Repair Type: Intermediate
Suturegard Retention Suture: 2-0 Nylon
Retention Suture Bite Size: 3 mm
Length To Time In Minutes Device Was In Place: 10
Intermediate / Complex Repair - Final Wound Length In Cm: 4.7
Undermining Type: Entire Wound
Debridement Text: The wound edges were debrided prior to proceeding with the closure to facilitate wound healing.
Helical Rim Text: The closure involved the helical rim.
Vermilion Border Text: The closure involved the vermilion border.
Nostril Rim Text: The closure involved the nostril rim.
Retention Suture Text: Retention sutures were placed to support the closure and prevent dehiscence.
Suture Removal: 10 days
Epidermal Closure Graft Donor Site (Optional): simple interrupted
Graft Donor Site Bandage (Optional-Leave Blank If You Don't Want In Note): Steri-strips and a pressure bandage were applied to the donor site.
Detail Level: Detailed
Excision Depth: adipose tissue
Scalpel Size: 15 blade
Anesthesia Type: 1% lidocaine with epinephrine
Additional Anesthesia Volume In Cc: 6
Hemostasis: Electrocautery
Estimated Blood Loss (Cc): minimal
Deep Sutures: 3-0 PDS
Epidermal Sutures: 4-0 Prolene
Epidermal Closure: running
Wound Care: Bactroban
Dressing: dry sterile dressing
Suturegard Intro: Intraoperative tissue expansion was performed, utilizing the SUTUREGARD device, in order to reduce wound tension.
Suturegard Body: The suture ends were repeatedly re-tightened and re-clamped to achieve the desired tissue expansion.
Hemigard Intro: Due to skin fragility and wound tension, it was decided to use HEMIGARD adhesive retention suture devices to permit a linear closure. The skin was cleaned and dried for a 6cm distance away from the wound. Excessive hair, if present, was removed to allow for adhesion.
Hemigard Postcare Instructions: The HEMIGARD strips are to remain completely dry for at least 5-7 days.
Positioning (Leave Blank If You Do Not Want): The patient was placed in a comfortable position exposing the surgical site.
Complex Repair Preamble Text (Leave Blank If You Do Not Want): Extensive wide undermining was performed.
Intermediate Repair Preamble Text (Leave Blank If You Do Not Want): Undermining was performed with blunt dissection.
Fusiform Excision Additional Text (Leave Blank If You Do Not Want): The margin was drawn around the clinically apparent lesion.  A fusiform shape was then drawn on the skin incorporating the lesion and margins.  Incisions were then made along these lines to the appropriate tissue plane and the lesion was extirpated.
Eliptical Excision Additional Text (Leave Blank If You Do Not Want): The margin was drawn around the clinically apparent lesion.  An elliptical shape was then drawn on the skin incorporating the lesion and margins.  Incisions were then made along these lines to the appropriate tissue plane and the lesion was extirpated.
Saucerization Excision Additional Text (Leave Blank If You Do Not Want): The margin was drawn around the clinically apparent lesion.  Incisions were then made along these lines, in a tangential fashion, to the appropriate tissue plane and the lesion was extirpated.
Slit Excision Additional Text (Leave Blank If You Do Not Want): A linear line was drawn on the skin overlying the lesion. An incision was made slowly until the lesion was visualized.  Once visualized, the lesion was removed with blunt dissection.
Excisional Biopsy Additional Text (Leave Blank If You Do Not Want): The margin was drawn around the clinically apparent lesion. An elliptical shape was then drawn on the skin incorporating the lesion and margins.  Incisions were then made along these lines to the appropriate tissue plane and the lesion was extirpated.
Perilesional Excision Additional Text (Leave Blank If You Do Not Want): The margin was drawn around the clinically apparent lesion. Incisions were then made along these lines to the appropriate tissue plane and the lesion was extirpated.
Repair Performed By Another Provider Text (Leave Blank If You Do Not Want): After the tissue was excised the defect was repaired by another provider.
No Repair - Repaired With Adjacent Surgical Defect Text (Leave Blank If You Do Not Want): After the excision the defect was repaired concurrently with another surgical defect which was in close approximation.
Adjacent Tissue Transfer Text: The defect edges were debeveled with a #15 scalpel blade.  Given the location of the defect and the proximity to free margins an adjacent tissue transfer was deemed most appropriate.  Using a sterile surgical marker, an appropriate flap was drawn incorporating the defect and placing the expected incisions within the relaxed skin tension lines where possible.    The area thus outlined was incised deep to adipose tissue with a #15 scalpel blade.  The skin margins were undermined to an appropriate distance in all directions utilizing iris scissors.
Advancement Flap (Single) Text: The defect edges were debeveled with a #15 scalpel blade.  Given the location of the defect and the proximity to free margins a single advancement flap was deemed most appropriate.  Using a sterile surgical marker, an appropriate advancement flap was drawn incorporating the defect and placing the expected incisions within the relaxed skin tension lines where possible.    The area thus outlined was incised deep to adipose tissue with a #15 scalpel blade.  The skin margins were undermined to an appropriate distance in all directions utilizing iris scissors.
Advancement Flap (Double) Text: The defect edges were debeveled with a #15 scalpel blade.  Given the location of the defect and the proximity to free margins a double advancement flap was deemed most appropriate.  Using a sterile surgical marker, the appropriate advancement flaps were drawn incorporating the defect and placing the expected incisions within the relaxed skin tension lines where possible.    The area thus outlined was incised deep to adipose tissue with a #15 scalpel blade.  The skin margins were undermined to an appropriate distance in all directions utilizing iris scissors.
Burow's Advancement Flap Text: The defect edges were debeveled with a #15 scalpel blade.  Given the location of the defect and the proximity to free margins a Burow's advancement flap was deemed most appropriate.  Using a sterile surgical marker, the appropriate advancement flap was drawn incorporating the defect and placing the expected incisions within the relaxed skin tension lines where possible.    The area thus outlined was incised deep to adipose tissue with a #15 scalpel blade.  The skin margins were undermined to an appropriate distance in all directions utilizing iris scissors.
Chonodrocutaneous Helical Advancement Flap Text: The defect edges were debeveled with a #15 scalpel blade.  Given the location of the defect and the proximity to free margins a chondrocutaneous helical advancement flap was deemed most appropriate.  Using a sterile surgical marker, the appropriate advancement flap was drawn incorporating the defect and placing the expected incisions within the relaxed skin tension lines where possible.    The area thus outlined was incised deep to adipose tissue with a #15 scalpel blade.  The skin margins were undermined to an appropriate distance in all directions utilizing iris scissors.
Crescentic Advancement Flap Text: The defect edges were debeveled with a #15 scalpel blade.  Given the location of the defect and the proximity to free margins a crescentic advancement flap was deemed most appropriate.  Using a sterile surgical marker, the appropriate advancement flap was drawn incorporating the defect and placing the expected incisions within the relaxed skin tension lines where possible.    The area thus outlined was incised deep to adipose tissue with a #15 scalpel blade.  The skin margins were undermined to an appropriate distance in all directions utilizing iris scissors.
A-T Advancement Flap Text: The defect edges were debeveled with a #15 scalpel blade.  Given the location of the defect, shape of the defect and the proximity to free margins an A-T advancement flap was deemed most appropriate.  Using a sterile surgical marker, an appropriate advancement flap was drawn incorporating the defect and placing the expected incisions within the relaxed skin tension lines where possible.    The area thus outlined was incised deep to adipose tissue with a #15 scalpel blade.  The skin margins were undermined to an appropriate distance in all directions utilizing iris scissors.
O-T Advancement Flap Text: The defect edges were debeveled with a #15 scalpel blade.  Given the location of the defect, shape of the defect and the proximity to free margins an O-T advancement flap was deemed most appropriate.  Using a sterile surgical marker, an appropriate advancement flap was drawn incorporating the defect and placing the expected incisions within the relaxed skin tension lines where possible.    The area thus outlined was incised deep to adipose tissue with a #15 scalpel blade.  The skin margins were undermined to an appropriate distance in all directions utilizing iris scissors.
O-L Flap Text: The defect edges were debeveled with a #15 scalpel blade.  Given the location of the defect, shape of the defect and the proximity to free margins an O-L flap was deemed most appropriate.  Using a sterile surgical marker, an appropriate advancement flap was drawn incorporating the defect and placing the expected incisions within the relaxed skin tension lines where possible.    The area thus outlined was incised deep to adipose tissue with a #15 scalpel blade.  The skin margins were undermined to an appropriate distance in all directions utilizing iris scissors.
O-Z Flap Text: The defect edges were debeveled with a #15 scalpel blade.  Given the location of the defect, shape of the defect and the proximity to free margins an O-Z flap was deemed most appropriate.  Using a sterile surgical marker, an appropriate transposition flap was drawn incorporating the defect and placing the expected incisions within the relaxed skin tension lines where possible. The area thus outlined was incised deep to adipose tissue with a #15 scalpel blade.  The skin margins were undermined to an appropriate distance in all directions utilizing iris scissors.
Double O-Z Flap Text: The defect edges were debeveled with a #15 scalpel blade.  Given the location of the defect, shape of the defect and the proximity to free margins a Double O-Z flap was deemed most appropriate.  Using a sterile surgical marker, an appropriate transposition flap was drawn incorporating the defect and placing the expected incisions within the relaxed skin tension lines where possible. The area thus outlined was incised deep to adipose tissue with a #15 scalpel blade.  The skin margins were undermined to an appropriate distance in all directions utilizing iris scissors.
V-Y Flap Text: The defect edges were debeveled with a #15 scalpel blade.  Given the location of the defect, shape of the defect and the proximity to free margins a V-Y flap was deemed most appropriate.  Using a sterile surgical marker, an appropriate advancement flap was drawn incorporating the defect and placing the expected incisions within the relaxed skin tension lines where possible.    The area thus outlined was incised deep to adipose tissue with a #15 scalpel blade.  The skin margins were undermined to an appropriate distance in all directions utilizing iris scissors.
Advancement-Rotation Flap Text: The defect edges were debeveled with a #15 scalpel blade.  Given the location of the defect, shape of the defect and the proximity to free margins an advancement-rotation flap was deemed most appropriate.  Using a sterile surgical marker, an appropriate flap was drawn incorporating the defect and placing the expected incisions within the relaxed skin tension lines where possible. The area thus outlined was incised deep to adipose tissue with a #15 scalpel blade.  The skin margins were undermined to an appropriate distance in all directions utilizing iris scissors.
Mercedes Flap Text: The defect edges were debeveled with a #15 scalpel blade.  Given the location of the defect, shape of the defect and the proximity to free margins a Mercedes flap was deemed most appropriate.  Using a sterile surgical marker, an appropriate advancement flap was drawn incorporating the defect and placing the expected incisions within the relaxed skin tension lines where possible. The area thus outlined was incised deep to adipose tissue with a #15 scalpel blade.  The skin margins were undermined to an appropriate distance in all directions utilizing iris scissors.
Modified Advancement Flap Text: The defect edges were debeveled with a #15 scalpel blade.  Given the location of the defect, shape of the defect and the proximity to free margins a modified advancement flap was deemed most appropriate.  Using a sterile surgical marker, an appropriate advancement flap was drawn incorporating the defect and placing the expected incisions within the relaxed skin tension lines where possible.    The area thus outlined was incised deep to adipose tissue with a #15 scalpel blade.  The skin margins were undermined to an appropriate distance in all directions utilizing iris scissors.
Mucosal Advancement Flap Text: Given the location of the defect, shape of the defect and the proximity to free margins a mucosal advancement flap was deemed most appropriate. Incisions were made with a 15 blade scalpel in the appropriate fashion along the cutaneous vermillion border and the mucosal lip. The remaining actinically damaged mucosal tissue was excised.  The mucosal advancement flap was then elevated to the gingival sulcus with care taken to preserve the neurovascular structures and advanced into the primary defect. Care was taken to ensure that precise realignment of the vermillion border was achieved.
Peng Advancement Flap Text: The defect edges were debeveled with a #15 scalpel blade.  Given the location of the defect, shape of the defect and the proximity to free margins a Peng advancement flap was deemed most appropriate.  Using a sterile surgical marker, an appropriate advancement flap was drawn incorporating the defect and placing the expected incisions within the relaxed skin tension lines where possible. The area thus outlined was incised deep to adipose tissue with a #15 scalpel blade.  The skin margins were undermined to an appropriate distance in all directions utilizing iris scissors.
Hatchet Flap Text: The defect edges were debeveled with a #15 scalpel blade.  Given the location of the defect, shape of the defect and the proximity to free margins a hatchet flap was deemed most appropriate.  Using a sterile surgical marker, an appropriate hatchet flap was drawn incorporating the defect and placing the expected incisions within the relaxed skin tension lines where possible.    The area thus outlined was incised deep to adipose tissue with a #15 scalpel blade.  The skin margins were undermined to an appropriate distance in all directions utilizing iris scissors.
Rotation Flap Text: The defect edges were debeveled with a #15 scalpel blade.  Given the location of the defect, shape of the defect and the proximity to free margins a rotation flap was deemed most appropriate.  Using a sterile surgical marker, an appropriate rotation flap was drawn incorporating the defect and placing the expected incisions within the relaxed skin tension lines where possible.    The area thus outlined was incised deep to adipose tissue with a #15 scalpel blade.  The skin margins were undermined to an appropriate distance in all directions utilizing iris scissors.
Spiral Flap Text: The defect edges were debeveled with a #15 scalpel blade.  Given the location of the defect, shape of the defect and the proximity to free margins a spiral flap was deemed most appropriate.  Using a sterile surgical marker, an appropriate rotation flap was drawn incorporating the defect and placing the expected incisions within the relaxed skin tension lines where possible. The area thus outlined was incised deep to adipose tissue with a #15 scalpel blade.  The skin margins were undermined to an appropriate distance in all directions utilizing iris scissors.
Staged Advancement Flap Text: The defect edges were debeveled with a #15 scalpel blade.  Given the location of the defect, shape of the defect and the proximity to free margins a staged advancement flap was deemed most appropriate.  Using a sterile surgical marker, an appropriate advancement flap was drawn incorporating the defect and placing the expected incisions within the relaxed skin tension lines where possible. The area thus outlined was incised deep to adipose tissue with a #15 scalpel blade.  The skin margins were undermined to an appropriate distance in all directions utilizing iris scissors.
Star Wedge Flap Text: The defect edges were debeveled with a #15 scalpel blade.  Given the location of the defect, shape of the defect and the proximity to free margins a star wedge flap was deemed most appropriate.  Using a sterile surgical marker, an appropriate rotation flap was drawn incorporating the defect and placing the expected incisions within the relaxed skin tension lines where possible. The area thus outlined was incised deep to adipose tissue with a #15 scalpel blade.  The skin margins were undermined to an appropriate distance in all directions utilizing iris scissors.
Transposition Flap Text: The defect edges were debeveled with a #15 scalpel blade.  Given the location of the defect and the proximity to free margins a transposition flap was deemed most appropriate.  Using a sterile surgical marker, an appropriate transposition flap was drawn incorporating the defect.    The area thus outlined was incised deep to adipose tissue with a #15 scalpel blade.  The skin margins were undermined to an appropriate distance in all directions utilizing iris scissors.
Muscle Hinge Flap Text: The defect edges were debeveled with a #15 scalpel blade.  Given the size, depth and location of the defect and the proximity to free margins a muscle hinge flap was deemed most appropriate.  Using a sterile surgical marker, an appropriate hinge flap was drawn incorporating the defect. The area thus outlined was incised with a #15 scalpel blade.  The skin margins were undermined to an appropriate distance in all directions utilizing iris scissors.
Mustarde Flap Text: The defect edges were debeveled with a #15 scalpel blade.  Given the size, depth and location of the defect and the proximity to free margins a Mustarde flap was deemed most appropriate.  Using a sterile surgical marker, an appropriate flap was drawn incorporating the defect. The area thus outlined was incised with a #15 scalpel blade.  The skin margins were undermined to an appropriate distance in all directions utilizing iris scissors.
Nasal Turnover Hinge Flap Text: The defect edges were debeveled with a #15 scalpel blade.  Given the size, depth, location of the defect and the defect being full thickness a nasal turnover hinge flap was deemed most appropriate.  Using a sterile surgical marker, an appropriate hinge flap was drawn incorporating the defect. The area thus outlined was incised with a #15 scalpel blade. The flap was designed to recreate the nasal mucosal lining and the alar rim. The skin margins were undermined to an appropriate distance in all directions utilizing iris scissors.
Nasalis-Muscle-Based Myocutaneous Island Pedicle Flap Text: Using a #15 blade, an incision was made around the donor flap to the level of the nasalis muscle. Wide lateral undermining was then performed in both the subcutaneous plane above the nasalis muscle, and in a submuscular plane just above periosteum. This allowed the formation of a free nasalis muscle axial pedicle (based on the angular artery) which was still attached to the actual cutaneous flap, increasing its mobility and vascular viability. Hemostasis was obtained with pinpoint electrocoagulation. The flap was mobilized into position and the pivotal anchor points positioned and stabilized with buried interrupted sutures. Subcutaneous and dermal tissues were closed in a multilayered fashion with sutures. Tissue redundancies were excised, and the epidermal edges were apposed without significant tension and sutured with sutures.
Orbicularis Oris Muscle Flap Text: The defect edges were debeveled with a #15 scalpel blade.  Given that the defect affected the competency of the oral sphincter an orbicularis oris muscle flap was deemed most appropriate to restore this competency and normal muscle function.  Using a sterile surgical marker, an appropriate flap was drawn incorporating the defect. The area thus outlined was incised with a #15 scalpel blade.
Melolabial Transposition Flap Text: The defect edges were debeveled with a #15 scalpel blade.  Given the location of the defect and the proximity to free margins a melolabial flap was deemed most appropriate.  Using a sterile surgical marker, an appropriate melolabial transposition flap was drawn incorporating the defect.    The area thus outlined was incised deep to adipose tissue with a #15 scalpel blade.  The skin margins were undermined to an appropriate distance in all directions utilizing iris scissors.
Rhombic Flap Text: The defect edges were debeveled with a #15 scalpel blade.  Given the location of the defect and the proximity to free margins a rhombic flap was deemed most appropriate.  Using a sterile surgical marker, an appropriate rhombic flap was drawn incorporating the defect.    The area thus outlined was incised deep to adipose tissue with a #15 scalpel blade.  The skin margins were undermined to an appropriate distance in all directions utilizing iris scissors.
Rhomboid Transposition Flap Text: The defect edges were debeveled with a #15 scalpel blade.  Given the location of the defect and the proximity to free margins a rhomboid transposition flap was deemed most appropriate.  Using a sterile surgical marker, an appropriate rhomboid flap was drawn incorporating the defect.    The area thus outlined was incised deep to adipose tissue with a #15 scalpel blade.  The skin margins were undermined to an appropriate distance in all directions utilizing iris scissors.
Bi-Rhombic Flap Text: The defect edges were debeveled with a #15 scalpel blade.  Given the location of the defect and the proximity to free margins a bi-rhombic flap was deemed most appropriate.  Using a sterile surgical marker, an appropriate rhombic flap was drawn incorporating the defect. The area thus outlined was incised deep to adipose tissue with a #15 scalpel blade.  The skin margins were undermined to an appropriate distance in all directions utilizing iris scissors.
Helical Rim Advancement Flap Text: The defect edges were debeveled with a #15 blade scalpel.  Given the location of the defect and the proximity to free margins (helical rim) a double helical rim advancement flap was deemed most appropriate.  Using a sterile surgical marker, the appropriate advancement flaps were drawn incorporating the defect and placing the expected incisions between the helical rim and antihelix where possible.  The area thus outlined was incised through and through with a #15 scalpel blade.  With a skin hook and iris scissors, the flaps were gently and sharply undermined and freed up.
Bilateral Helical Rim Advancement Flap Text: The defect edges were debeveled with a #15 blade scalpel.  Given the location of the defect and the proximity to free margins (helical rim) a bilateral helical rim advancement flap was deemed most appropriate.  Using a sterile surgical marker, the appropriate advancement flaps were drawn incorporating the defect and placing the expected incisions between the helical rim and antihelix where possible.  The area thus outlined was incised through and through with a #15 scalpel blade.  With a skin hook and iris scissors, the flaps were gently and sharply undermined and freed up.
Ear Star Wedge Flap Text: The defect edges were debeveled with a #15 blade scalpel.  Given the location of the defect and the proximity to free margins (helical rim) an ear star wedge flap was deemed most appropriate.  Using a sterile surgical marker, the appropriate flap was drawn incorporating the defect and placing the expected incisions between the helical rim and antihelix where possible.  The area thus outlined was incised through and through with a #15 scalpel blade.
Banner Transposition Flap Text: The defect edges were debeveled with a #15 scalpel blade.  Given the location of the defect and the proximity to free margins a Banner transposition flap was deemed most appropriate.  Using a sterile surgical marker, an appropriate flap drawn around the defect. The area thus outlined was incised deep to adipose tissue with a #15 scalpel blade.  The skin margins were undermined to an appropriate distance in all directions utilizing iris scissors.
Bilobed Flap Text: The defect edges were debeveled with a #15 scalpel blade.  Given the location of the defect and the proximity to free margins a bilobe flap was deemed most appropriate.  Using a sterile surgical marker, an appropriate bilobe flap drawn around the defect.    The area thus outlined was incised deep to adipose tissue with a #15 scalpel blade.  The skin margins were undermined to an appropriate distance in all directions utilizing iris scissors.
Bilobed Transposition Flap Text: The defect edges were debeveled with a #15 scalpel blade.  Given the location of the defect and the proximity to free margins a bilobed transposition flap was deemed most appropriate.  Using a sterile surgical marker, an appropriate bilobe flap drawn around the defect.    The area thus outlined was incised deep to adipose tissue with a #15 scalpel blade.  The skin margins were undermined to an appropriate distance in all directions utilizing iris scissors.
Trilobed Flap Text: The defect edges were debeveled with a #15 scalpel blade.  Given the location of the defect and the proximity to free margins a trilobed flap was deemed most appropriate.  Using a sterile surgical marker, an appropriate trilobed flap drawn around the defect.    The area thus outlined was incised deep to adipose tissue with a #15 scalpel blade.  The skin margins were undermined to an appropriate distance in all directions utilizing iris scissors.
Dorsal Nasal Flap Text: The defect edges were debeveled with a #15 scalpel blade.  Given the location of the defect and the proximity to free margins a dorsal nasal flap was deemed most appropriate.  Using a sterile surgical marker, an appropriate dorsal nasal flap was drawn around the defect.    The area thus outlined was incised deep to adipose tissue with a #15 scalpel blade.  The skin margins were undermined to an appropriate distance in all directions utilizing iris scissors.
Island Pedicle Flap Text: The defect edges were debeveled with a #15 scalpel blade.  Given the location of the defect, shape of the defect and the proximity to free margins an island pedicle advancement flap was deemed most appropriate.  Using a sterile surgical marker, an appropriate advancement flap was drawn incorporating the defect, outlining the appropriate donor tissue and placing the expected incisions within the relaxed skin tension lines where possible.    The area thus outlined was incised deep to adipose tissue with a #15 scalpel blade.  The skin margins were undermined to an appropriate distance in all directions around the primary defect and laterally outward around the island pedicle utilizing iris scissors.  There was minimal undermining beneath the pedicle flap.
Island Pedicle Flap With Canthal Suspension Text: The defect edges were debeveled with a #15 scalpel blade.  Given the location of the defect, shape of the defect and the proximity to free margins an island pedicle advancement flap was deemed most appropriate.  Using a sterile surgical marker, an appropriate advancement flap was drawn incorporating the defect, outlining the appropriate donor tissue and placing the expected incisions within the relaxed skin tension lines where possible. The area thus outlined was incised deep to adipose tissue with a #15 scalpel blade.  The skin margins were undermined to an appropriate distance in all directions around the primary defect and laterally outward around the island pedicle utilizing iris scissors.  There was minimal undermining beneath the pedicle flap. A suspension suture was placed in the canthal tendon to prevent tension and prevent ectropion.
Alar Island Pedicle Flap Text: The defect edges were debeveled with a #15 scalpel blade.  Given the location of the defect, shape of the defect and the proximity to the alar rim an island pedicle advancement flap was deemed most appropriate.  Using a sterile surgical marker, an appropriate advancement flap was drawn incorporating the defect, outlining the appropriate donor tissue and placing the expected incisions within the nasal ala running parallel to the alar rim. The area thus outlined was incised with a #15 scalpel blade.  The skin margins were undermined minimally to an appropriate distance in all directions around the primary defect and laterally outward around the island pedicle utilizing iris scissors.  There was minimal undermining beneath the pedicle flap.
Double Island Pedicle Flap Text: The defect edges were debeveled with a #15 scalpel blade.  Given the location of the defect, shape of the defect and the proximity to free margins a double island pedicle advancement flap was deemed most appropriate.  Using a sterile surgical marker, an appropriate advancement flap was drawn incorporating the defect, outlining the appropriate donor tissue and placing the expected incisions within the relaxed skin tension lines where possible.    The area thus outlined was incised deep to adipose tissue with a #15 scalpel blade.  The skin margins were undermined to an appropriate distance in all directions around the primary defect and laterally outward around the island pedicle utilizing iris scissors.  There was minimal undermining beneath the pedicle flap.
Island Pedicle Flap-Requiring Vessel Identification Text: The defect edges were debeveled with a #15 scalpel blade.  Given the location of the defect, shape of the defect and the proximity to free margins an island pedicle advancement flap was deemed most appropriate.  Using a sterile surgical marker, an appropriate advancement flap was drawn, based on the axial vessel mentioned above, incorporating the defect, outlining the appropriate donor tissue and placing the expected incisions within the relaxed skin tension lines where possible.    The area thus outlined was incised deep to adipose tissue with a #15 scalpel blade.  The skin margins were undermined to an appropriate distance in all directions around the primary defect and laterally outward around the island pedicle utilizing iris scissors.  There was minimal undermining beneath the pedicle flap.
Keystone Flap Text: The defect edges were debeveled with a #15 scalpel blade.  Given the location of the defect, shape of the defect a keystone flap was deemed most appropriate.  Using a sterile surgical marker, an appropriate keystone flap was drawn incorporating the defect, outlining the appropriate donor tissue and placing the expected incisions within the relaxed skin tension lines where possible. The area thus outlined was incised deep to adipose tissue with a #15 scalpel blade.  The skin margins were undermined to an appropriate distance in all directions around the primary defect and laterally outward around the flap utilizing iris scissors.
O-T Plasty Text: The defect edges were debeveled with a #15 scalpel blade.  Given the location of the defect, shape of the defect and the proximity to free margins an O-T plasty was deemed most appropriate.  Using a sterile surgical marker, an appropriate O-T plasty was drawn incorporating the defect and placing the expected incisions within the relaxed skin tension lines where possible.    The area thus outlined was incised deep to adipose tissue with a #15 scalpel blade.  The skin margins were undermined to an appropriate distance in all directions utilizing iris scissors.
O-Z Plasty Text: The defect edges were debeveled with a #15 scalpel blade.  Given the location of the defect, shape of the defect and the proximity to free margins an O-Z plasty (double transposition flap) was deemed most appropriate.  Using a sterile surgical marker, the appropriate transposition flaps were drawn incorporating the defect and placing the expected incisions within the relaxed skin tension lines where possible.    The area thus outlined was incised deep to adipose tissue with a #15 scalpel blade.  The skin margins were undermined to an appropriate distance in all directions utilizing iris scissors.  Hemostasis was achieved with electrocautery.  The flaps were then transposed into place, one clockwise and the other counterclockwise, and anchored with interrupted buried subcutaneous sutures.
Double O-Z Plasty Text: The defect edges were debeveled with a #15 scalpel blade.  Given the location of the defect, shape of the defect and the proximity to free margins a Double O-Z plasty (double transposition flap) was deemed most appropriate.  Using a sterile surgical marker, the appropriate transposition flaps were drawn incorporating the defect and placing the expected incisions within the relaxed skin tension lines where possible. The area thus outlined was incised deep to adipose tissue with a #15 scalpel blade.  The skin margins were undermined to an appropriate distance in all directions utilizing iris scissors.  Hemostasis was achieved with electrocautery.  The flaps were then transposed into place, one clockwise and the other counterclockwise, and anchored with interrupted buried subcutaneous sutures.
V-Y Plasty Text: The defect edges were debeveled with a #15 scalpel blade.  Given the location of the defect, shape of the defect and the proximity to free margins an V-Y advancement flap was deemed most appropriate.  Using a sterile surgical marker, an appropriate advancement flap was drawn incorporating the defect and placing the expected incisions within the relaxed skin tension lines where possible.    The area thus outlined was incised deep to adipose tissue with a #15 scalpel blade.  The skin margins were undermined to an appropriate distance in all directions utilizing iris scissors.
H Plasty Text: Given the location of the defect, shape of the defect and the proximity to free margins a H-plasty was deemed most appropriate for repair.  Using a sterile surgical marker, the appropriate advancement arms of the H-plasty were drawn incorporating the defect and placing the expected incisions within the relaxed skin tension lines where possible. The area thus outlined was incised deep to adipose tissue with a #15 scalpel blade. The skin margins were undermined to an appropriate distance in all directions utilizing iris scissors.  The opposing advancement arms were then advanced into place in opposite direction and anchored with interrupted buried subcutaneous sutures.
W Plasty Text: The lesion was extirpated to the level of the fat with a #15 scalpel blade.  Given the location of the defect, shape of the defect and the proximity to free margins a W-plasty was deemed most appropriate for repair.  Using a sterile surgical marker, the appropriate transposition arms of the W-plasty were drawn incorporating the defect and placing the expected incisions within the relaxed skin tension lines where possible.    The area thus outlined was incised deep to adipose tissue with a #15 scalpel blade.  The skin margins were undermined to an appropriate distance in all directions utilizing iris scissors.  The opposing transposition arms were then transposed into place in opposite direction and anchored with interrupted buried subcutaneous sutures.
Z Plasty Text: The lesion was extirpated to the level of the fat with a #15 scalpel blade.  Given the location of the defect, shape of the defect and the proximity to free margins a Z-plasty was deemed most appropriate for repair.  Using a sterile surgical marker, the appropriate transposition arms of the Z-plasty were drawn incorporating the defect and placing the expected incisions within the relaxed skin tension lines where possible.    The area thus outlined was incised deep to adipose tissue with a #15 scalpel blade.  The skin margins were undermined to an appropriate distance in all directions utilizing iris scissors.  The opposing transposition arms were then transposed into place in opposite direction and anchored with interrupted buried subcutaneous sutures.
Zygomaticofacial Flap Text: Given the location of the defect, shape of the defect and the proximity to free margins a zygomaticofacial flap was deemed most appropriate for repair.  Using a sterile surgical marker, the appropriate flap was drawn incorporating the defect and placing the expected incisions within the relaxed skin tension lines where possible. The area thus outlined was incised deep to adipose tissue with a #15 scalpel blade with preservation of a vascular pedicle.  The skin margins were undermined to an appropriate distance in all directions utilizing iris scissors.  The flap was then placed into the defect and anchored with interrupted buried subcutaneous sutures.
Cheek Interpolation Flap Text: A decision was made to reconstruct the defect utilizing an interpolation axial flap and a staged reconstruction.  A telfa template was made of the defect.  This telfa template was then used to outline the Cheek Interpolation flap.  The donor area for the pedicle flap was then injected with anesthesia.  The flap was excised through the skin and subcutaneous tissue down to the layer of the underlying musculature.  The interpolation flap was carefully excised within this deep plane to maintain its blood supply.  The edges of the donor site were undermined.   The donor site was closed in a primary fashion.  The pedicle was then rotated into position and sutured.  Once the tube was sutured into place, adequate blood supply was confirmed with blanching and refill.  The pedicle was then wrapped with xeroform gauze and dressed appropriately with a telfa and gauze bandage to ensure continued blood supply and protect the attached pedicle.
Cheek-To-Nose Interpolation Flap Text: A decision was made to reconstruct the defect utilizing an interpolation axial flap and a staged reconstruction.  A telfa template was made of the defect.  This telfa template was then used to outline the Cheek-To-Nose Interpolation flap.  The donor area for the pedicle flap was then injected with anesthesia.  The flap was excised through the skin and subcutaneous tissue down to the layer of the underlying musculature.  The interpolation flap was carefully excised within this deep plane to maintain its blood supply.  The edges of the donor site were undermined.   The donor site was closed in a primary fashion.  The pedicle was then rotated into position and sutured.  Once the tube was sutured into place, adequate blood supply was confirmed with blanching and refill.  The pedicle was then wrapped with xeroform gauze and dressed appropriately with a telfa and gauze bandage to ensure continued blood supply and protect the attached pedicle.
Interpolation Flap Text: A decision was made to reconstruct the defect utilizing an interpolation axial flap and a staged reconstruction.  A telfa template was made of the defect.  This telfa template was then used to outline the interpolation flap.  The donor area for the pedicle flap was then injected with anesthesia.  The flap was excised through the skin and subcutaneous tissue down to the layer of the underlying musculature.  The interpolation flap was carefully excised within this deep plane to maintain its blood supply.  The edges of the donor site were undermined.   The donor site was closed in a primary fashion.  The pedicle was then rotated into position and sutured.  Once the tube was sutured into place, adequate blood supply was confirmed with blanching and refill.  The pedicle was then wrapped with xeroform gauze and dressed appropriately with a telfa and gauze bandage to ensure continued blood supply and protect the attached pedicle.
Melolabial Interpolation Flap Text: A decision was made to reconstruct the defect utilizing an interpolation axial flap and a staged reconstruction.  A telfa template was made of the defect.  This telfa template was then used to outline the melolabial interpolation flap.  The donor area for the pedicle flap was then injected with anesthesia.  The flap was excised through the skin and subcutaneous tissue down to the layer of the underlying musculature.  The pedicle flap was carefully excised within this deep plane to maintain its blood supply.  The edges of the donor site were undermined.   The donor site was closed in a primary fashion.  The pedicle was then rotated into position and sutured.  Once the tube was sutured into place, adequate blood supply was confirmed with blanching and refill.  The pedicle was then wrapped with xeroform gauze and dressed appropriately with a telfa and gauze bandage to ensure continued blood supply and protect the attached pedicle.
Mastoid Interpolation Flap Text: A decision was made to reconstruct the defect utilizing an interpolation axial flap and a staged reconstruction.  A telfa template was made of the defect.  This telfa template was then used to outline the mastoid interpolation flap.  The donor area for the pedicle flap was then injected with anesthesia.  The flap was excised through the skin and subcutaneous tissue down to the layer of the underlying musculature.  The pedicle flap was carefully excised within this deep plane to maintain its blood supply.  The edges of the donor site were undermined.   The donor site was closed in a primary fashion.  The pedicle was then rotated into position and sutured.  Once the tube was sutured into place, adequate blood supply was confirmed with blanching and refill.  The pedicle was then wrapped with xeroform gauze and dressed appropriately with a telfa and gauze bandage to ensure continued blood supply and protect the attached pedicle.
Posterior Auricular Interpolation Flap Text: A decision was made to reconstruct the defect utilizing an interpolation axial flap and a staged reconstruction.  A telfa template was made of the defect.  This telfa template was then used to outline the posterior auricular interpolation flap.  The donor area for the pedicle flap was then injected with anesthesia.  The flap was excised through the skin and subcutaneous tissue down to the layer of the underlying musculature.  The pedicle flap was carefully excised within this deep plane to maintain its blood supply.  The edges of the donor site were undermined.   The donor site was closed in a primary fashion.  The pedicle was then rotated into position and sutured.  Once the tube was sutured into place, adequate blood supply was confirmed with blanching and refill.  The pedicle was then wrapped with xeroform gauze and dressed appropriately with a telfa and gauze bandage to ensure continued blood supply and protect the attached pedicle.
Paramedian Forehead Flap Text: A decision was made to reconstruct the defect utilizing an interpolation axial flap and a staged reconstruction.  A telfa template was made of the defect.  This telfa template was then used to outline the paramedian forehead pedicle flap.  The donor area for the pedicle flap was then injected with anesthesia.  The flap was excised through the skin and subcutaneous tissue down to the layer of the underlying musculature.  The pedicle flap was carefully excised within this deep plane to maintain its blood supply.  The edges of the donor site were undermined.   The donor site was closed in a primary fashion.  The pedicle was then rotated into position and sutured.  Once the tube was sutured into place, adequate blood supply was confirmed with blanching and refill.  The pedicle was then wrapped with xeroform gauze and dressed appropriately with a telfa and gauze bandage to ensure continued blood supply and protect the attached pedicle.
Lip Wedge Excision Repair Text: Given the location of the defect and the proximity to free margins a full thickness wedge repair was deemed most appropriate.  Using a sterile surgical marker, the appropriate repair was drawn incorporating the defect and placing the expected incisions perpendicular to the vermillion border.  The vermillion border was also meticulously outlined to ensure appropriate reapproximation during the repair.  The area thus outlined was incised through and through with a #15 scalpel blade.  The muscularis and dermis were reaproximated with deep sutures following hemostasis. Care was taken to realign the vermillion border before proceeding with the superficial closure.  Once the vermillion was realigned the superfical and mucosal closure was finished.
Ftsg Text: The defect edges were debeveled with a #15 scalpel blade.  Given the location of the defect, shape of the defect and the proximity to free margins a full thickness skin graft was deemed most appropriate.  Using a sterile surgical marker, the primary defect shape was transferred to the donor site. The area thus outlined was incised deep to adipose tissue with a #15 scalpel blade.  The harvested graft was then trimmed of adipose tissue until only dermis and epidermis was left.  The skin margins of the secondary defect were undermined to an appropriate distance in all directions utilizing iris scissors.  The secondary defect was closed with interrupted buried subcutaneous sutures.  The skin edges were then re-apposed with running  sutures.  The skin graft was then placed in the primary defect and oriented appropriately.
Split-Thickness Skin Graft Text: The defect edges were debeveled with a #15 scalpel blade.  Given the location of the defect, shape of the defect and the proximity to free margins a split thickness skin graft was deemed most appropriate.  Using a sterile surgical marker, the primary defect shape was transferred to the donor site. The split thickness graft was then harvested.  The skin graft was then placed in the primary defect and oriented appropriately.
Burow's Graft Text: The defect edges were debeveled with a #15 scalpel blade.  Given the location of the defect, shape of the defect, the proximity to free margins and the presence of a standing cone deformity a Burow's skin graft was deemed most appropriate. The standing cone was removed and this tissue was then trimmed to the shape of the primary defect. The adipose tissue was also removed until only dermis and epidermis were left.  The skin margins of the secondary defect were undermined to an appropriate distance in all directions utilizing iris scissors.  The secondary defect was closed with interrupted buried subcutaneous sutures.  The skin edges were then re-apposed with running  sutures.  The skin graft was then placed in the primary defect and oriented appropriately.
Cartilage Graft Text: The defect edges were debeveled with a #15 scalpel blade.  Given the location of the defect, shape of the defect, the fact the defect involved a full thickness cartilage defect a cartilage graft was deemed most appropriate.  An appropriate donor site was identified, cleansed, and anesthetized. The cartilage graft was then harvested and transferred to the recipient site, oriented appropriately and then sutured into place.  The secondary defect was then repaired using a primary closure.
Composite Graft Text: The defect edges were debeveled with a #15 scalpel blade.  Given the location of the defect, shape of the defect, the proximity to free margins and the fact the defect was full thickness a composite graft was deemed most appropriate.  The defect was outline and then transferred to the donor site.  A full thickness graft was then excised from the donor site. The graft was then placed in the primary defect, oriented appropriately and then sutured into place.  The secondary defect was then repaired using a primary closure.
Epidermal Autograft Text: The defect edges were debeveled with a #15 scalpel blade.  Given the location of the defect, shape of the defect and the proximity to free margins an epidermal autograft was deemed most appropriate.  Using a sterile surgical marker, the primary defect shape was transferred to the donor site. The epidermal graft was then harvested.  The skin graft was then placed in the primary defect and oriented appropriately.
Dermal Autograft Text: The defect edges were debeveled with a #15 scalpel blade.  Given the location of the defect, shape of the defect and the proximity to free margins a dermal autograft was deemed most appropriate.  Using a sterile surgical marker, the primary defect shape was transferred to the donor site. The area thus outlined was incised deep to adipose tissue with a #15 scalpel blade.  The harvested graft was then trimmed of adipose and epidermal tissue until only dermis was left.  The skin graft was then placed in the primary defect and oriented appropriately.
Skin Substitute Text: The defect edges were debeveled with a #15 scalpel blade.  Given the location of the defect, shape of the defect and the proximity to free margins a skin substitute graft was deemed most appropriate.  The graft material was trimmed to fit the size of the defect. The graft was then placed in the primary defect and oriented appropriately.
Tissue Cultured Epidermal Autograft Text: The defect edges were debeveled with a #15 scalpel blade.  Given the location of the defect, shape of the defect and the proximity to free margins a tissue cultured epidermal autograft was deemed most appropriate.  The graft was then trimmed to fit the size of the defect.  The graft was then placed in the primary defect and oriented appropriately.
Xenograft Text: The defect edges were debeveled with a #15 scalpel blade.  Given the location of the defect, shape of the defect and the proximity to free margins a xenograft was deemed most appropriate.  The graft was then trimmed to fit the size of the defect.  The graft was then placed in the primary defect and oriented appropriately.
Purse String (Intermediate) Text: Given the location of the defect and the characteristics of the surrounding skin a pursestring intermediate closure was deemed most appropriate.  Undermining was performed circumfirentially around the surgical defect.  A purstring suture was then placed and tightened.
Purse String (Simple) Text: Given the location of the defect and the characteristics of the surrounding skin a purse string simple closure was deemed most appropriate.  Undermining was performed circumferentially around the surgical defect.  A purse string suture was then placed and tightened.
Complex Repair And Single Advancement Flap Text: The defect edges were debeveled with a #15 scalpel blade.  The primary defect was closed partially with a complex linear closure.  Given the location of the remaining defect, shape of the defect and the proximity to free margins a single advancement flap was deemed most appropriate for complete closure of the defect.  Using a sterile surgical marker, an appropriate advancement flap was drawn incorporating the defect and placing the expected incisions within the relaxed skin tension lines where possible.    The area thus outlined was incised deep to adipose tissue with a #15 scalpel blade.  The skin margins were undermined to an appropriate distance in all directions utilizing iris scissors.
Complex Repair And Double Advancement Flap Text: The defect edges were debeveled with a #15 scalpel blade.  The primary defect was closed partially with a complex linear closure.  Given the location of the remaining defect, shape of the defect and the proximity to free margins a double advancement flap was deemed most appropriate for complete closure of the defect.  Using a sterile surgical marker, an appropriate advancement flap was drawn incorporating the defect and placing the expected incisions within the relaxed skin tension lines where possible.    The area thus outlined was incised deep to adipose tissue with a #15 scalpel blade.  The skin margins were undermined to an appropriate distance in all directions utilizing iris scissors.
Complex Repair And Modified Advancement Flap Text: The defect edges were debeveled with a #15 scalpel blade.  The primary defect was closed partially with a complex linear closure.  Given the location of the remaining defect, shape of the defect and the proximity to free margins a modified advancement flap was deemed most appropriate for complete closure of the defect.  Using a sterile surgical marker, an appropriate advancement flap was drawn incorporating the defect and placing the expected incisions within the relaxed skin tension lines where possible.    The area thus outlined was incised deep to adipose tissue with a #15 scalpel blade.  The skin margins were undermined to an appropriate distance in all directions utilizing iris scissors.
Complex Repair And A-T Advancement Flap Text: The defect edges were debeveled with a #15 scalpel blade.  The primary defect was closed partially with a complex linear closure.  Given the location of the remaining defect, shape of the defect and the proximity to free margins an A-T advancement flap was deemed most appropriate for complete closure of the defect.  Using a sterile surgical marker, an appropriate advancement flap was drawn incorporating the defect and placing the expected incisions within the relaxed skin tension lines where possible.    The area thus outlined was incised deep to adipose tissue with a #15 scalpel blade.  The skin margins were undermined to an appropriate distance in all directions utilizing iris scissors.
Complex Repair And O-T Advancement Flap Text: The defect edges were debeveled with a #15 scalpel blade.  The primary defect was closed partially with a complex linear closure.  Given the location of the remaining defect, shape of the defect and the proximity to free margins an O-T advancement flap was deemed most appropriate for complete closure of the defect.  Using a sterile surgical marker, an appropriate advancement flap was drawn incorporating the defect and placing the expected incisions within the relaxed skin tension lines where possible.    The area thus outlined was incised deep to adipose tissue with a #15 scalpel blade.  The skin margins were undermined to an appropriate distance in all directions utilizing iris scissors.
Complex Repair And O-L Flap Text: The defect edges were debeveled with a #15 scalpel blade.  The primary defect was closed partially with a complex linear closure.  Given the location of the remaining defect, shape of the defect and the proximity to free margins an O-L flap was deemed most appropriate for complete closure of the defect.  Using a sterile surgical marker, an appropriate flap was drawn incorporating the defect and placing the expected incisions within the relaxed skin tension lines where possible.    The area thus outlined was incised deep to adipose tissue with a #15 scalpel blade.  The skin margins were undermined to an appropriate distance in all directions utilizing iris scissors.
Complex Repair And Bilobe Flap Text: The defect edges were debeveled with a #15 scalpel blade.  The primary defect was closed partially with a complex linear closure.  Given the location of the remaining defect, shape of the defect and the proximity to free margins a bilobe flap was deemed most appropriate for complete closure of the defect.  Using a sterile surgical marker, an appropriate advancement flap was drawn incorporating the defect and placing the expected incisions within the relaxed skin tension lines where possible.    The area thus outlined was incised deep to adipose tissue with a #15 scalpel blade.  The skin margins were undermined to an appropriate distance in all directions utilizing iris scissors.
Complex Repair And Melolabial Flap Text: The defect edges were debeveled with a #15 scalpel blade.  The primary defect was closed partially with a complex linear closure.  Given the location of the remaining defect, shape of the defect and the proximity to free margins a melolabial flap was deemed most appropriate for complete closure of the defect.  Using a sterile surgical marker, an appropriate advancement flap was drawn incorporating the defect and placing the expected incisions within the relaxed skin tension lines where possible.    The area thus outlined was incised deep to adipose tissue with a #15 scalpel blade.  The skin margins were undermined to an appropriate distance in all directions utilizing iris scissors.
Complex Repair And Rotation Flap Text: The defect edges were debeveled with a #15 scalpel blade.  The primary defect was closed partially with a complex linear closure.  Given the location of the remaining defect, shape of the defect and the proximity to free margins a rotation flap was deemed most appropriate for complete closure of the defect.  Using a sterile surgical marker, an appropriate advancement flap was drawn incorporating the defect and placing the expected incisions within the relaxed skin tension lines where possible.    The area thus outlined was incised deep to adipose tissue with a #15 scalpel blade.  The skin margins were undermined to an appropriate distance in all directions utilizing iris scissors.
Complex Repair And Rhombic Flap Text: The defect edges were debeveled with a #15 scalpel blade.  The primary defect was closed partially with a complex linear closure.  Given the location of the remaining defect, shape of the defect and the proximity to free margins a rhombic flap was deemed most appropriate for complete closure of the defect.  Using a sterile surgical marker, an appropriate advancement flap was drawn incorporating the defect and placing the expected incisions within the relaxed skin tension lines where possible.    The area thus outlined was incised deep to adipose tissue with a #15 scalpel blade.  The skin margins were undermined to an appropriate distance in all directions utilizing iris scissors.
Complex Repair And Transposition Flap Text: The defect edges were debeveled with a #15 scalpel blade.  The primary defect was closed partially with a complex linear closure.  Given the location of the remaining defect, shape of the defect and the proximity to free margins a transposition flap was deemed most appropriate for complete closure of the defect.  Using a sterile surgical marker, an appropriate advancement flap was drawn incorporating the defect and placing the expected incisions within the relaxed skin tension lines where possible.    The area thus outlined was incised deep to adipose tissue with a #15 scalpel blade.  The skin margins were undermined to an appropriate distance in all directions utilizing iris scissors.
Complex Repair And V-Y Plasty Text: The defect edges were debeveled with a #15 scalpel blade.  The primary defect was closed partially with a complex linear closure.  Given the location of the remaining defect, shape of the defect and the proximity to free margins a V-Y plasty was deemed most appropriate for complete closure of the defect.  Using a sterile surgical marker, an appropriate advancement flap was drawn incorporating the defect and placing the expected incisions within the relaxed skin tension lines where possible.    The area thus outlined was incised deep to adipose tissue with a #15 scalpel blade.  The skin margins were undermined to an appropriate distance in all directions utilizing iris scissors.
Complex Repair And M Plasty Text: The defect edges were debeveled with a #15 scalpel blade.  The primary defect was closed partially with a complex linear closure.  Given the location of the remaining defect, shape of the defect and the proximity to free margins an M plasty was deemed most appropriate for complete closure of the defect.  Using a sterile surgical marker, an appropriate advancement flap was drawn incorporating the defect and placing the expected incisions within the relaxed skin tension lines where possible.    The area thus outlined was incised deep to adipose tissue with a #15 scalpel blade.  The skin margins were undermined to an appropriate distance in all directions utilizing iris scissors.
Complex Repair And Double M Plasty Text: The defect edges were debeveled with a #15 scalpel blade.  The primary defect was closed partially with a complex linear closure.  Given the location of the remaining defect, shape of the defect and the proximity to free margins a double M plasty was deemed most appropriate for complete closure of the defect.  Using a sterile surgical marker, an appropriate advancement flap was drawn incorporating the defect and placing the expected incisions within the relaxed skin tension lines where possible.    The area thus outlined was incised deep to adipose tissue with a #15 scalpel blade.  The skin margins were undermined to an appropriate distance in all directions utilizing iris scissors.
Complex Repair And W Plasty Text: The defect edges were debeveled with a #15 scalpel blade.  The primary defect was closed partially with a complex linear closure.  Given the location of the remaining defect, shape of the defect and the proximity to free margins a W plasty was deemed most appropriate for complete closure of the defect.  Using a sterile surgical marker, an appropriate advancement flap was drawn incorporating the defect and placing the expected incisions within the relaxed skin tension lines where possible.    The area thus outlined was incised deep to adipose tissue with a #15 scalpel blade.  The skin margins were undermined to an appropriate distance in all directions utilizing iris scissors.
Complex Repair And Z Plasty Text: The defect edges were debeveled with a #15 scalpel blade.  The primary defect was closed partially with a complex linear closure.  Given the location of the remaining defect, shape of the defect and the proximity to free margins a Z plasty was deemed most appropriate for complete closure of the defect.  Using a sterile surgical marker, an appropriate advancement flap was drawn incorporating the defect and placing the expected incisions within the relaxed skin tension lines where possible.    The area thus outlined was incised deep to adipose tissue with a #15 scalpel blade.  The skin margins were undermined to an appropriate distance in all directions utilizing iris scissors.
Complex Repair And Dorsal Nasal Flap Text: The defect edges were debeveled with a #15 scalpel blade.  The primary defect was closed partially with a complex linear closure.  Given the location of the remaining defect, shape of the defect and the proximity to free margins a dorsal nasal flap was deemed most appropriate for complete closure of the defect.  Using a sterile surgical marker, an appropriate flap was drawn incorporating the defect and placing the expected incisions within the relaxed skin tension lines where possible.    The area thus outlined was incised deep to adipose tissue with a #15 scalpel blade.  The skin margins were undermined to an appropriate distance in all directions utilizing iris scissors.
Complex Repair And Ftsg Text: The defect edges were debeveled with a #15 scalpel blade.  The primary defect was closed partially with a complex linear closure.  Given the location of the defect, shape of the defect and the proximity to free margins a full thickness skin graft was deemed most appropriate to repair the remaining defect.  The graft was trimmed to fit the size of the remaining defect.  The graft was then placed in the primary defect, oriented appropriately, and sutured into place.
Complex Repair And Burow's Graft Text: The defect edges were debeveled with a #15 scalpel blade.  The primary defect was closed partially with a complex linear closure.  Given the location of the defect, shape of the defect, the proximity to free margins and the presence of a standing cone deformity a Burow's graft was deemed most appropriate to repair the remaining defect.  The graft was trimmed to fit the size of the remaining defect.  The graft was then placed in the primary defect, oriented appropriately, and sutured into place.
Complex Repair And Split-Thickness Skin Graft Text: The defect edges were debeveled with a #15 scalpel blade.  The primary defect was closed partially with a complex linear closure.  Given the location of the defect, shape of the defect and the proximity to free margins a split thickness skin graft was deemed most appropriate to repair the remaining defect.  The graft was trimmed to fit the size of the remaining defect.  The graft was then placed in the primary defect, oriented appropriately, and sutured into place.
Complex Repair And Epidermal Autograft Text: The defect edges were debeveled with a #15 scalpel blade.  The primary defect was closed partially with a complex linear closure.  Given the location of the defect, shape of the defect and the proximity to free margins an epidermal autograft was deemed most appropriate to repair the remaining defect.  The graft was trimmed to fit the size of the remaining defect.  The graft was then placed in the primary defect, oriented appropriately, and sutured into place.
Complex Repair And Dermal Autograft Text: The defect edges were debeveled with a #15 scalpel blade.  The primary defect was closed partially with a complex linear closure.  Given the location of the defect, shape of the defect and the proximity to free margins an dermal autograft was deemed most appropriate to repair the remaining defect.  The graft was trimmed to fit the size of the remaining defect.  The graft was then placed in the primary defect, oriented appropriately, and sutured into place.
Complex Repair And Tissue Cultured Epidermal Autograft Text: The defect edges were debeveled with a #15 scalpel blade.  The primary defect was closed partially with a complex linear closure.  Given the location of the defect, shape of the defect and the proximity to free margins an tissue cultured epidermal autograft was deemed most appropriate to repair the remaining defect.  The graft was trimmed to fit the size of the remaining defect.  The graft was then placed in the primary defect, oriented appropriately, and sutured into place.
Complex Repair And Xenograft Text: The defect edges were debeveled with a #15 scalpel blade.  The primary defect was closed partially with a complex linear closure.  Given the location of the defect, shape of the defect and the proximity to free margins an tissue cultured epidermal autograft was deemed most appropriate to repair the remaining defect.  The graft was trimmed to fit the size of the remaining defect.  The graft was then placed in the primary defect, oriented appropriately, and sutured into place.
Complex Repair And Skin Substitute Graft Text: The defect edges were debeveled with a #15 scalpel blade.  The primary defect was closed partially with a complex linear closure.  Given the location of the remaining defect, shape of the defect and the proximity to free margins a skin substitute graft was deemed most appropriate to repair the remaining defect.  The graft was trimmed to fit the size of the remaining defect.  The graft was then placed in the primary defect, oriented appropriately, and sutured into place.
Path Notes (To The Dermatopathologist): Please check margins.
Consent was obtained from the patient. The risks and benefits to therapy were discussed in detail. Specifically, the risks of infection, scarring, bleeding, prolonged wound healing, incomplete removal, allergy to anesthesia, nerve injury and recurrence were addressed. Prior to the procedure, the treatment site was clearly identified and confirmed by the patient. All components of Universal Protocol/PAUSE Rule completed.
Post-Care Instructions: I reviewed with the patient in detail post-care instructions. Patient is not to engage in any heavy lifting, exercise, or swimming for the next 14 days. Should the patient develop any fevers, chills, bleeding, severe pain patient will contact the office immediately.
Home Suture Removal Text: Patient was provided a home suture removal kit and will remove their sutures at home.  If they have any questions or difficulties they will call the office.
Where Do You Want The Question To Include Opioid Counseling Located?: Case Summary Tab
Billing Type: Third-Party Bill
Information: Selecting Yes will display possible errors in your note based on the variables you have selected. This validation is only offered as a suggestion for you. PLEASE NOTE THAT THE VALIDATION TEXT WILL BE REMOVED WHEN YOU FINALIZE YOUR NOTE. IF YOU WANT TO FAX A PRELIMINARY NOTE YOU WILL NEED TO TOGGLE THIS TO 'NO' IF YOU DO NOT WANT IT IN YOUR FAXED NOTE.

## 2022-05-31 ENCOUNTER — APPOINTMENT (RX ONLY)
Dept: URBAN - METROPOLITAN AREA CLINIC 167 | Facility: CLINIC | Age: 82
Setting detail: DERMATOLOGY
End: 2022-05-31

## 2022-05-31 DIAGNOSIS — Z48.02 ENCOUNTER FOR REMOVAL OF SUTURES: ICD-10-CM

## 2022-05-31 PROCEDURE — ? SUTURE REMOVAL (GLOBAL PERIOD)

## 2022-05-31 PROCEDURE — 99024 POSTOP FOLLOW-UP VISIT: CPT

## 2022-05-31 ASSESSMENT — LOCATION SIMPLE DESCRIPTION DERM: LOCATION SIMPLE: LEFT UPPER BACK

## 2022-05-31 ASSESSMENT — LOCATION ZONE DERM: LOCATION ZONE: TRUNK

## 2022-05-31 ASSESSMENT — LOCATION DETAILED DESCRIPTION DERM: LOCATION DETAILED: LEFT SUPERIOR UPPER BACK

## 2022-05-31 NOTE — PROCEDURE: SUTURE REMOVAL (GLOBAL PERIOD)
Detail Level: Detailed
Add 02268 Cpt? (Important Note: In 2017 The Use Of 03648 Is Being Tracked By Cms To Determine Future Global Period Reimbursement For Global Periods): yes

## 2022-10-21 ENCOUNTER — APPOINTMENT (RX ONLY)
Dept: URBAN - METROPOLITAN AREA CLINIC 170 | Facility: CLINIC | Age: 82
Setting detail: DERMATOLOGY
End: 2022-10-21

## 2022-10-21 DIAGNOSIS — L82.1 OTHER SEBORRHEIC KERATOSIS: ICD-10-CM

## 2022-10-21 DIAGNOSIS — D22 MELANOCYTIC NEVI: ICD-10-CM

## 2022-10-21 DIAGNOSIS — D18.0 HEMANGIOMA: ICD-10-CM

## 2022-10-21 DIAGNOSIS — Z85.828 PERSONAL HISTORY OF OTHER MALIGNANT NEOPLASM OF SKIN: ICD-10-CM

## 2022-10-21 PROBLEM — D22.61 MELANOCYTIC NEVI OF RIGHT UPPER LIMB, INCLUDING SHOULDER: Status: ACTIVE | Noted: 2022-10-21

## 2022-10-21 PROBLEM — D22.62 MELANOCYTIC NEVI OF LEFT UPPER LIMB, INCLUDING SHOULDER: Status: ACTIVE | Noted: 2022-10-21

## 2022-10-21 PROBLEM — D18.01 HEMANGIOMA OF SKIN AND SUBCUTANEOUS TISSUE: Status: ACTIVE | Noted: 2022-10-21

## 2022-10-21 PROBLEM — D22.72 MELANOCYTIC NEVI OF LEFT LOWER LIMB, INCLUDING HIP: Status: ACTIVE | Noted: 2022-10-21

## 2022-10-21 PROBLEM — D22.71 MELANOCYTIC NEVI OF RIGHT LOWER LIMB, INCLUDING HIP: Status: ACTIVE | Noted: 2022-10-21

## 2022-10-21 PROBLEM — D22.5 MELANOCYTIC NEVI OF TRUNK: Status: ACTIVE | Noted: 2022-10-21

## 2022-10-21 PROCEDURE — ? COUNSELING

## 2022-10-21 PROCEDURE — 99213 OFFICE O/P EST LOW 20 MIN: CPT

## 2022-10-21 ASSESSMENT — LOCATION DETAILED DESCRIPTION DERM
LOCATION DETAILED: LEFT DISTAL CALF
LOCATION DETAILED: INFERIOR THORACIC SPINE
LOCATION DETAILED: LEFT PROXIMAL POSTERIOR UPPER ARM
LOCATION DETAILED: LEFT PROXIMAL LATERAL CALF
LOCATION DETAILED: RIGHT PROXIMAL PRETIBIAL REGION
LOCATION DETAILED: LEFT CENTRAL MALAR CHEEK
LOCATION DETAILED: RIGHT PROXIMAL CALF
LOCATION DETAILED: RIGHT RIB CAGE
LOCATION DETAILED: RIGHT PROXIMAL POSTERIOR UPPER ARM
LOCATION DETAILED: SUPERIOR THORACIC SPINE
LOCATION DETAILED: RIGHT DISTAL CALF

## 2022-10-21 ASSESSMENT — LOCATION ZONE DERM
LOCATION ZONE: TRUNK
LOCATION ZONE: LEG
LOCATION ZONE: ARM
LOCATION ZONE: FACE

## 2022-10-21 ASSESSMENT — LOCATION SIMPLE DESCRIPTION DERM
LOCATION SIMPLE: LEFT CALF
LOCATION SIMPLE: RIGHT CALF
LOCATION SIMPLE: ABDOMEN
LOCATION SIMPLE: UPPER BACK
LOCATION SIMPLE: LEFT CHEEK
LOCATION SIMPLE: RIGHT UPPER ARM
LOCATION SIMPLE: LEFT UPPER ARM
LOCATION SIMPLE: RIGHT PRETIBIAL REGION

## 2022-10-25 ENCOUNTER — OFFICE VISIT (OUTPATIENT)
Dept: URBAN - METROPOLITAN AREA CLINIC 13 | Facility: CLINIC | Age: 82
End: 2022-10-25
Payer: MEDICARE

## 2022-10-25 DIAGNOSIS — H35.371 PUCKERING OF MACULA, RIGHT EYE: Primary | ICD-10-CM

## 2022-10-25 DIAGNOSIS — H31.091 OTHER CHORIORETINAL SCARS, RIGHT EYE: ICD-10-CM

## 2022-10-25 DIAGNOSIS — Z96.1 PRESENCE OF INTRAOCULAR LENS: ICD-10-CM

## 2022-10-25 DIAGNOSIS — H43.813 VITREOUS DEGENERATION, BILATERAL: ICD-10-CM

## 2022-10-25 PROCEDURE — 99214 OFFICE O/P EST MOD 30 MIN: CPT | Performed by: OPHTHALMOLOGY

## 2022-10-25 PROCEDURE — 92134 CPTRZ OPH DX IMG PST SGM RTA: CPT | Performed by: OPHTHALMOLOGY

## 2022-10-25 ASSESSMENT — INTRAOCULAR PRESSURE
OS: 9
OD: 10

## 2022-10-25 NOTE — IMPRESSION/PLAN
Impression: Puckering of macula, right eye: H35.371. OCT= min ERM, no SRF/IRF OU with  / 269  Plan: Very early, with great contour and stable. There is an epiretinal membrane (ERM), but the patient is doing well with their current vision, and thus we will observe the ERM for now. We did discuss the natural history as well as the risks and benefits of observation vs. vitrectomy surgery. The patient will call if they experience decreased vision or increased metamorphopsia. 

rec 1-2 year exam with Dr. Arabella Banuelos or us

## 2022-10-25 NOTE — IMPRESSION/PLAN
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
s/p Laser x RT at 10 o'clock Plan: Stable. Obs.  RDW. Minimal ERM.

## 2023-04-20 ENCOUNTER — APPOINTMENT (RX ONLY)
Dept: URBAN - METROPOLITAN AREA CLINIC 170 | Facility: CLINIC | Age: 83
Setting detail: DERMATOLOGY
End: 2023-04-20

## 2023-04-20 DIAGNOSIS — L82.1 OTHER SEBORRHEIC KERATOSIS: ICD-10-CM

## 2023-04-20 DIAGNOSIS — D22 MELANOCYTIC NEVI: ICD-10-CM

## 2023-04-20 DIAGNOSIS — Z85.828 PERSONAL HISTORY OF OTHER MALIGNANT NEOPLASM OF SKIN: ICD-10-CM

## 2023-04-20 DIAGNOSIS — D18.0 HEMANGIOMA: ICD-10-CM

## 2023-04-20 DIAGNOSIS — L82.0 INFLAMED SEBORRHEIC KERATOSIS: ICD-10-CM

## 2023-04-20 DIAGNOSIS — L57.0 ACTINIC KERATOSIS: ICD-10-CM

## 2023-04-20 PROBLEM — D18.01 HEMANGIOMA OF SKIN AND SUBCUTANEOUS TISSUE: Status: ACTIVE | Noted: 2023-04-20

## 2023-04-20 PROBLEM — D22.71 MELANOCYTIC NEVI OF RIGHT LOWER LIMB, INCLUDING HIP: Status: ACTIVE | Noted: 2023-04-20

## 2023-04-20 PROBLEM — D22.72 MELANOCYTIC NEVI OF LEFT LOWER LIMB, INCLUDING HIP: Status: ACTIVE | Noted: 2023-04-20

## 2023-04-20 PROBLEM — D22.62 MELANOCYTIC NEVI OF LEFT UPPER LIMB, INCLUDING SHOULDER: Status: ACTIVE | Noted: 2023-04-20

## 2023-04-20 PROBLEM — D22.5 MELANOCYTIC NEVI OF TRUNK: Status: ACTIVE | Noted: 2023-04-20

## 2023-04-20 PROBLEM — D22.61 MELANOCYTIC NEVI OF RIGHT UPPER LIMB, INCLUDING SHOULDER: Status: ACTIVE | Noted: 2023-04-20

## 2023-04-20 PROCEDURE — 17000 DESTRUCT PREMALG LESION: CPT | Mod: 59

## 2023-04-20 PROCEDURE — 99213 OFFICE O/P EST LOW 20 MIN: CPT | Mod: 25

## 2023-04-20 PROCEDURE — ? LIQUID NITROGEN

## 2023-04-20 PROCEDURE — 17110 DESTRUCTION B9 LES UP TO 14: CPT

## 2023-04-20 PROCEDURE — ? COUNSELING

## 2023-04-20 ASSESSMENT — LOCATION DETAILED DESCRIPTION DERM
LOCATION DETAILED: RIGHT PROXIMAL POSTERIOR UPPER ARM
LOCATION DETAILED: RIGHT PROXIMAL PRETIBIAL REGION
LOCATION DETAILED: RIGHT ANTERIOR EARLOBE
LOCATION DETAILED: RIGHT PROXIMAL CALF
LOCATION DETAILED: LEFT PROXIMAL LATERAL CALF
LOCATION DETAILED: LEFT PROXIMAL POSTERIOR UPPER ARM
LOCATION DETAILED: INFERIOR THORACIC SPINE
LOCATION DETAILED: LEFT DISTAL CALF
LOCATION DETAILED: RIGHT RIB CAGE
LOCATION DETAILED: RIGHT DISTAL CALF
LOCATION DETAILED: LEFT INFERIOR ANTERIOR NECK
LOCATION DETAILED: SUPERIOR THORACIC SPINE
LOCATION DETAILED: LEFT SUPERIOR POSTAURICULAR SKIN
LOCATION DETAILED: LEFT CENTRAL MALAR CHEEK

## 2023-04-20 ASSESSMENT — LOCATION SIMPLE DESCRIPTION DERM
LOCATION SIMPLE: RIGHT EAR
LOCATION SIMPLE: LEFT ANTERIOR NECK
LOCATION SIMPLE: ABDOMEN
LOCATION SIMPLE: RIGHT CALF
LOCATION SIMPLE: UPPER BACK
LOCATION SIMPLE: RIGHT UPPER ARM
LOCATION SIMPLE: LEFT CALF
LOCATION SIMPLE: LEFT CHEEK
LOCATION SIMPLE: LEFT UPPER ARM
LOCATION SIMPLE: RIGHT PRETIBIAL REGION
LOCATION SIMPLE: SCALP

## 2023-04-20 ASSESSMENT — LOCATION ZONE DERM
LOCATION ZONE: SCALP
LOCATION ZONE: ARM
LOCATION ZONE: EAR
LOCATION ZONE: NECK
LOCATION ZONE: FACE
LOCATION ZONE: LEG
LOCATION ZONE: TRUNK

## 2023-04-20 NOTE — PROCEDURE: LIQUID NITROGEN
Consent: The patient's consent was obtained including but not limited to risks of crusting, scabbing, blistering, scarring, darker or lighter pigmentary change, recurrence, incomplete removal and infection.
Include Z78.9 (Other Specified Conditions Influencing Health Status) As An Associated Diagnosis?: Yes
Render Note In Bullet Format When Appropriate: No
Spray Paint Text: The liquid nitrogen was applied to the skin utilizing a spray paint frosting technique.
Medical Necessity Clause: This procedure was medically necessary because the lesions that were treated were:irritated by picking
Post-Care Instructions: I reviewed with the patient in detail post-care instructions. Patient is to wear sunprotection, and avoid picking at any of the treated lesions. Pt may apply Vaseline to crusted or scabbing areas.
Number Of Freeze-Thaw Cycles: 1 freeze-thaw cycle
Medical Necessity Information: It is in your best interest to select a reason for this procedure from the list below. All of these items fulfill various CMS LCD requirements except the new and changing color options.
Detail Level: Simple
Consent: The patient's consent was obtained including but not limited to risks of crusting, scabbing, blistering, scarring, darker or lighter pigmentary change, recurrence, incomplete removal and infection.  The patient will follow up one month if the lesions have not resolved.
Duration Of Freeze Thaw-Cycle (Seconds): 3

## 2023-10-26 ENCOUNTER — APPOINTMENT (RX ONLY)
Dept: URBAN - METROPOLITAN AREA CLINIC 170 | Facility: CLINIC | Age: 83
Setting detail: DERMATOLOGY
End: 2023-10-26

## 2023-10-26 DIAGNOSIS — L81.4 OTHER MELANIN HYPERPIGMENTATION: ICD-10-CM

## 2023-10-26 DIAGNOSIS — D18.0 HEMANGIOMA: ICD-10-CM

## 2023-10-26 DIAGNOSIS — Z85.828 PERSONAL HISTORY OF OTHER MALIGNANT NEOPLASM OF SKIN: ICD-10-CM

## 2023-10-26 DIAGNOSIS — D22 MELANOCYTIC NEVI: ICD-10-CM

## 2023-10-26 DIAGNOSIS — L82.1 OTHER SEBORRHEIC KERATOSIS: ICD-10-CM

## 2023-10-26 DIAGNOSIS — Z71.89 OTHER SPECIFIED COUNSELING: ICD-10-CM

## 2023-10-26 PROBLEM — D48.5 NEOPLASM OF UNCERTAIN BEHAVIOR OF SKIN: Status: ACTIVE | Noted: 2023-10-26

## 2023-10-26 PROBLEM — D22.5 MELANOCYTIC NEVI OF TRUNK: Status: ACTIVE | Noted: 2023-10-26

## 2023-10-26 PROBLEM — D22.62 MELANOCYTIC NEVI OF LEFT UPPER LIMB, INCLUDING SHOULDER: Status: ACTIVE | Noted: 2023-10-26

## 2023-10-26 PROBLEM — D22.71 MELANOCYTIC NEVI OF RIGHT LOWER LIMB, INCLUDING HIP: Status: ACTIVE | Noted: 2023-10-26

## 2023-10-26 PROBLEM — D22.72 MELANOCYTIC NEVI OF LEFT LOWER LIMB, INCLUDING HIP: Status: ACTIVE | Noted: 2023-10-26

## 2023-10-26 PROBLEM — D22.61 MELANOCYTIC NEVI OF RIGHT UPPER LIMB, INCLUDING SHOULDER: Status: ACTIVE | Noted: 2023-10-26

## 2023-10-26 PROBLEM — D18.01 HEMANGIOMA OF SKIN AND SUBCUTANEOUS TISSUE: Status: ACTIVE | Noted: 2023-10-26

## 2023-10-26 PROCEDURE — ? COUNSELING

## 2023-10-26 PROCEDURE — 99213 OFFICE O/P EST LOW 20 MIN: CPT | Mod: 25

## 2023-10-26 PROCEDURE — 17271 DSTR MAL LES S/N/H/F/G 0.6-1: CPT

## 2023-10-26 PROCEDURE — ? BIOPSY BY SHAVE METHOD AND DESTRUCTION

## 2023-10-26 ASSESSMENT — LOCATION SIMPLE DESCRIPTION DERM
LOCATION SIMPLE: POSTERIOR NECK
LOCATION SIMPLE: RIGHT PRETIBIAL REGION
LOCATION SIMPLE: LEFT CHEEK
LOCATION SIMPLE: RIGHT CALF
LOCATION SIMPLE: LEFT SHOULDER
LOCATION SIMPLE: RIGHT SHOULDER
LOCATION SIMPLE: ABDOMEN
LOCATION SIMPLE: LEFT CALF
LOCATION SIMPLE: LEFT UPPER ARM
LOCATION SIMPLE: UPPER BACK
LOCATION SIMPLE: RIGHT UPPER ARM

## 2023-10-26 ASSESSMENT — LOCATION ZONE DERM
LOCATION ZONE: LEG
LOCATION ZONE: NECK
LOCATION ZONE: FACE
LOCATION ZONE: TRUNK
LOCATION ZONE: ARM

## 2023-10-26 ASSESSMENT — LOCATION DETAILED DESCRIPTION DERM
LOCATION DETAILED: RIGHT POSTERIOR SHOULDER
LOCATION DETAILED: LEFT CENTRAL MALAR CHEEK
LOCATION DETAILED: RIGHT DISTAL CALF
LOCATION DETAILED: RIGHT PROXIMAL PRETIBIAL REGION
LOCATION DETAILED: LEFT PROXIMAL LATERAL CALF
LOCATION DETAILED: RIGHT MEDIAL TRAPEZIAL NECK
LOCATION DETAILED: RIGHT PROXIMAL CALF
LOCATION DETAILED: INFERIOR THORACIC SPINE
LOCATION DETAILED: SUPERIOR THORACIC SPINE
LOCATION DETAILED: LEFT PROXIMAL POSTERIOR UPPER ARM
LOCATION DETAILED: LEFT POSTERIOR SHOULDER
LOCATION DETAILED: RIGHT RIB CAGE
LOCATION DETAILED: LEFT ANTERIOR SHOULDER
LOCATION DETAILED: RIGHT PROXIMAL POSTERIOR UPPER ARM
LOCATION DETAILED: LEFT DISTAL CALF

## 2023-12-11 ENCOUNTER — OFFICE VISIT (OUTPATIENT)
Dept: URBAN - METROPOLITAN AREA CLINIC 7 | Facility: CLINIC | Age: 83
End: 2023-12-11
Payer: MEDICARE

## 2023-12-11 DIAGNOSIS — H43.813 VITREOUS DEGENERATION, BILATERAL: ICD-10-CM

## 2023-12-11 DIAGNOSIS — H31.091 OTHER CHORIORETINAL SCARS, RIGHT EYE: ICD-10-CM

## 2023-12-11 DIAGNOSIS — H35.371 PUCKERING OF MACULA, RIGHT EYE: Primary | ICD-10-CM

## 2023-12-11 DIAGNOSIS — Z96.1 PRESENCE OF INTRAOCULAR LENS: ICD-10-CM

## 2023-12-11 PROCEDURE — 99212 OFFICE O/P EST SF 10 MIN: CPT | Performed by: OPHTHALMOLOGY

## 2023-12-11 PROCEDURE — 92134 CPTRZ OPH DX IMG PST SGM RTA: CPT | Performed by: OPHTHALMOLOGY

## 2023-12-11 ASSESSMENT — INTRAOCULAR PRESSURE
OS: 9
OD: 12

## 2024-01-19 ENCOUNTER — APPOINTMENT (RX ONLY)
Dept: URBAN - METROPOLITAN AREA CLINIC 170 | Facility: CLINIC | Age: 84
Setting detail: DERMATOLOGY
End: 2024-01-19

## 2024-01-19 DIAGNOSIS — L259 CONTACT DERMATITIS AND OTHER ECZEMA, UNSPECIFIED CAUSE: ICD-10-CM

## 2024-01-19 DIAGNOSIS — L50.1 IDIOPATHIC URTICARIA: ICD-10-CM

## 2024-01-19 PROBLEM — L23.9 ALLERGIC CONTACT DERMATITIS, UNSPECIFIED CAUSE: Status: ACTIVE | Noted: 2024-01-19

## 2024-01-19 PROCEDURE — ? TREATMENT REGIMEN

## 2024-01-19 PROCEDURE — ? COUNSELING

## 2024-01-19 PROCEDURE — 99213 OFFICE O/P EST LOW 20 MIN: CPT

## 2024-01-19 ASSESSMENT — LOCATION SIMPLE DESCRIPTION DERM
LOCATION SIMPLE: RIGHT PRETIBIAL REGION
LOCATION SIMPLE: LEFT PRETIBIAL REGION
LOCATION SIMPLE: ABDOMEN

## 2024-01-19 ASSESSMENT — LOCATION DETAILED DESCRIPTION DERM
LOCATION DETAILED: PERIUMBILICAL SKIN
LOCATION DETAILED: LEFT DISTAL PRETIBIAL REGION
LOCATION DETAILED: RIGHT DISTAL PRETIBIAL REGION

## 2024-01-19 ASSESSMENT — LOCATION ZONE DERM
LOCATION ZONE: TRUNK
LOCATION ZONE: LEG

## 2024-01-19 NOTE — PROCEDURE: TREATMENT REGIMEN
Plan: Recommend patient begin taking an antihistamine nightly. Recommended Zyrtec, Claritin, or Allegra.
Detail Level: Simple

## 2024-01-19 NOTE — HPI: RASH
Is This A New Presentation, Or A Follow-Up?: Rash
Additional History: Patient reports he was also previously having some itching on the abdomen, states he believes it was due to new unwashed shirts. States the itching has gone away since he washed his shirts.

## 2024-05-15 ENCOUNTER — APPOINTMENT (RX ONLY)
Dept: URBAN - METROPOLITAN AREA CLINIC 167 | Facility: CLINIC | Age: 84
Setting detail: DERMATOLOGY
End: 2024-05-15

## 2024-05-15 DIAGNOSIS — L81.4 OTHER MELANIN HYPERPIGMENTATION: ICD-10-CM

## 2024-05-15 DIAGNOSIS — L82.1 OTHER SEBORRHEIC KERATOSIS: ICD-10-CM

## 2024-05-15 DIAGNOSIS — Z71.89 OTHER SPECIFIED COUNSELING: ICD-10-CM

## 2024-05-15 DIAGNOSIS — Z85.828 PERSONAL HISTORY OF OTHER MALIGNANT NEOPLASM OF SKIN: ICD-10-CM

## 2024-05-15 DIAGNOSIS — D18.0 HEMANGIOMA: ICD-10-CM

## 2024-05-15 DIAGNOSIS — L30.9 DERMATITIS, UNSPECIFIED: ICD-10-CM | Status: INADEQUATELY CONTROLLED

## 2024-05-15 DIAGNOSIS — D22 MELANOCYTIC NEVI: ICD-10-CM

## 2024-05-15 PROBLEM — D22.62 MELANOCYTIC NEVI OF LEFT UPPER LIMB, INCLUDING SHOULDER: Status: ACTIVE | Noted: 2024-05-15

## 2024-05-15 PROBLEM — D22.61 MELANOCYTIC NEVI OF RIGHT UPPER LIMB, INCLUDING SHOULDER: Status: ACTIVE | Noted: 2024-05-15

## 2024-05-15 PROBLEM — D22.5 MELANOCYTIC NEVI OF TRUNK: Status: ACTIVE | Noted: 2024-05-15

## 2024-05-15 PROBLEM — D22.71 MELANOCYTIC NEVI OF RIGHT LOWER LIMB, INCLUDING HIP: Status: ACTIVE | Noted: 2024-05-15

## 2024-05-15 PROBLEM — D22.72 MELANOCYTIC NEVI OF LEFT LOWER LIMB, INCLUDING HIP: Status: ACTIVE | Noted: 2024-05-15

## 2024-05-15 PROBLEM — D18.01 HEMANGIOMA OF SKIN AND SUBCUTANEOUS TISSUE: Status: ACTIVE | Noted: 2024-05-15

## 2024-05-15 PROCEDURE — 99214 OFFICE O/P EST MOD 30 MIN: CPT

## 2024-05-15 PROCEDURE — ? COUNSELING

## 2024-05-15 PROCEDURE — ? TREATMENT REGIMEN

## 2024-05-15 PROCEDURE — ? ORDER TESTS

## 2024-05-15 PROCEDURE — ? PRESCRIPTION

## 2024-05-15 RX ORDER — TRIAMCINOLONE ACETONIDE 1 MG/G
CREAM TOPICAL
Qty: 454 | Refills: 2 | Status: ERX | COMMUNITY
Start: 2024-05-15

## 2024-05-15 RX ADMIN — TRIAMCINOLONE ACETONIDE: 1 CREAM TOPICAL at 00:00

## 2024-05-15 ASSESSMENT — LOCATION SIMPLE DESCRIPTION DERM
LOCATION SIMPLE: LEFT CALF
LOCATION SIMPLE: POSTERIOR NECK
LOCATION SIMPLE: RIGHT PRETIBIAL REGION
LOCATION SIMPLE: ABDOMEN
LOCATION SIMPLE: RIGHT SHOULDER
LOCATION SIMPLE: RIGHT CALF
LOCATION SIMPLE: LEFT UPPER ARM
LOCATION SIMPLE: RIGHT FOREARM
LOCATION SIMPLE: LEFT FOREARM
LOCATION SIMPLE: RIGHT UPPER ARM
LOCATION SIMPLE: LEFT SHOULDER
LOCATION SIMPLE: LEFT CHEEK
LOCATION SIMPLE: UPPER BACK

## 2024-05-15 ASSESSMENT — LOCATION DETAILED DESCRIPTION DERM
LOCATION DETAILED: LEFT ANTERIOR SHOULDER
LOCATION DETAILED: LEFT PROXIMAL DORSAL FOREARM
LOCATION DETAILED: RIGHT PROXIMAL POSTERIOR UPPER ARM
LOCATION DETAILED: LEFT DISTAL CALF
LOCATION DETAILED: RIGHT RIB CAGE
LOCATION DETAILED: RIGHT PROXIMAL CALF
LOCATION DETAILED: INFERIOR THORACIC SPINE
LOCATION DETAILED: LEFT PROXIMAL POSTERIOR UPPER ARM
LOCATION DETAILED: SUPERIOR THORACIC SPINE
LOCATION DETAILED: RIGHT POSTERIOR SHOULDER
LOCATION DETAILED: RIGHT PROXIMAL PRETIBIAL REGION
LOCATION DETAILED: LEFT CENTRAL MALAR CHEEK
LOCATION DETAILED: RIGHT DISTAL DORSAL FOREARM
LOCATION DETAILED: RIGHT MEDIAL TRAPEZIAL NECK
LOCATION DETAILED: LEFT PROXIMAL LATERAL CALF
LOCATION DETAILED: RIGHT DISTAL CALF
LOCATION DETAILED: LEFT POSTERIOR SHOULDER

## 2024-05-15 ASSESSMENT — LOCATION ZONE DERM
LOCATION ZONE: NECK
LOCATION ZONE: TRUNK
LOCATION ZONE: ARM
LOCATION ZONE: LEG
LOCATION ZONE: FACE

## 2024-05-15 NOTE — PROCEDURE: TREATMENT REGIMEN
Otc Regimen: Hibiclens wash twice daily to the arms
Initiate Treatment: triamcinolone acetonide 0.1 % topical cream \\nApply to the affected areas of arms, and legs twice a day for 2 weeks, then stop and use as needed for itching.
Detail Level: Simple

## 2024-05-15 NOTE — PROCEDURE: ORDER TESTS
Performing Laboratory: [id number]
Billing Type: Third-Party Bill
Bill For Surgical Tray: no
Expected Date Of Service: 05/15/2024

## 2024-05-23 ENCOUNTER — RX ONLY (OUTPATIENT)
Age: 84
Setting detail: RX ONLY
End: 2024-05-23

## 2024-05-23 RX ORDER — DOXYCYCLINE 100 MG/1
CAPSULE ORAL
Qty: 14 | Refills: 0 | Status: ERX | COMMUNITY
Start: 2024-05-23

## 2024-06-27 ENCOUNTER — APPOINTMENT (RX ONLY)
Dept: URBAN - METROPOLITAN AREA CLINIC 170 | Facility: CLINIC | Age: 84
Setting detail: DERMATOLOGY
End: 2024-06-27

## 2024-06-27 DIAGNOSIS — L21.8 OTHER SEBORRHEIC DERMATITIS: ICD-10-CM

## 2024-06-27 DIAGNOSIS — L56.4 POLYMORPHOUS LIGHT ERUPTION: ICD-10-CM

## 2024-06-27 PROCEDURE — ? TREATMENT REGIMEN

## 2024-06-27 PROCEDURE — ? PRESCRIPTION

## 2024-06-27 PROCEDURE — 99213 OFFICE O/P EST LOW 20 MIN: CPT

## 2024-06-27 PROCEDURE — ? COUNSELING

## 2024-06-27 RX ORDER — HYDROCORTISONE 25 MG/G
CREAM TOPICAL
Qty: 28 | Refills: 2 | Status: ERX | COMMUNITY
Start: 2024-06-27

## 2024-06-27 RX ORDER — KETOCONAZOLE 20 MG/G
CREAM TOPICAL
Qty: 60 | Refills: 3 | Status: ERX | COMMUNITY
Start: 2024-06-27

## 2024-06-27 RX ADMIN — HYDROCORTISONE: 25 CREAM TOPICAL at 00:00

## 2024-06-27 RX ADMIN — KETOCONAZOLE: 20 CREAM TOPICAL at 00:00

## 2024-06-27 ASSESSMENT — LOCATION DETAILED DESCRIPTION DERM
LOCATION DETAILED: LEFT ANTERIOR PROXIMAL THIGH
LOCATION DETAILED: RIGHT ANTERIOR PROXIMAL THIGH
LOCATION DETAILED: RIGHT DISTAL DORSAL FOREARM
LOCATION DETAILED: LEFT DISTAL DORSAL FOREARM
LOCATION DETAILED: MID-FRONTAL SCALP

## 2024-06-27 ASSESSMENT — LOCATION SIMPLE DESCRIPTION DERM
LOCATION SIMPLE: RIGHT FOREARM
LOCATION SIMPLE: LEFT THIGH
LOCATION SIMPLE: ANTERIOR SCALP
LOCATION SIMPLE: LEFT FOREARM
LOCATION SIMPLE: RIGHT THIGH

## 2024-06-27 ASSESSMENT — LOCATION ZONE DERM
LOCATION ZONE: SCALP
LOCATION ZONE: LEG
LOCATION ZONE: ARM

## 2024-11-18 ENCOUNTER — APPOINTMENT (RX ONLY)
Dept: URBAN - METROPOLITAN AREA CLINIC 167 | Facility: CLINIC | Age: 84
Setting detail: DERMATOLOGY
End: 2024-11-18

## 2024-11-18 DIAGNOSIS — L57.0 ACTINIC KERATOSIS: ICD-10-CM

## 2024-11-18 DIAGNOSIS — Z85.828 PERSONAL HISTORY OF OTHER MALIGNANT NEOPLASM OF SKIN: ICD-10-CM

## 2024-11-18 DIAGNOSIS — L82.1 OTHER SEBORRHEIC KERATOSIS: ICD-10-CM

## 2024-11-18 DIAGNOSIS — Z71.89 OTHER SPECIFIED COUNSELING: ICD-10-CM

## 2024-11-18 DIAGNOSIS — L21.8 OTHER SEBORRHEIC DERMATITIS: ICD-10-CM

## 2024-11-18 DIAGNOSIS — L56.4 POLYMORPHOUS LIGHT ERUPTION: ICD-10-CM

## 2024-11-18 DIAGNOSIS — L81.4 OTHER MELANIN HYPERPIGMENTATION: ICD-10-CM

## 2024-11-18 DIAGNOSIS — D18.0 HEMANGIOMA: ICD-10-CM

## 2024-11-18 DIAGNOSIS — D22 MELANOCYTIC NEVI: ICD-10-CM

## 2024-11-18 PROBLEM — D22.71 MELANOCYTIC NEVI OF RIGHT LOWER LIMB, INCLUDING HIP: Status: ACTIVE | Noted: 2024-11-18

## 2024-11-18 PROBLEM — D22.62 MELANOCYTIC NEVI OF LEFT UPPER LIMB, INCLUDING SHOULDER: Status: ACTIVE | Noted: 2024-11-18

## 2024-11-18 PROBLEM — D18.01 HEMANGIOMA OF SKIN AND SUBCUTANEOUS TISSUE: Status: ACTIVE | Noted: 2024-11-18

## 2024-11-18 PROBLEM — D22.61 MELANOCYTIC NEVI OF RIGHT UPPER LIMB, INCLUDING SHOULDER: Status: ACTIVE | Noted: 2024-11-18

## 2024-11-18 PROBLEM — D22.5 MELANOCYTIC NEVI OF TRUNK: Status: ACTIVE | Noted: 2024-11-18

## 2024-11-18 PROBLEM — D22.72 MELANOCYTIC NEVI OF LEFT LOWER LIMB, INCLUDING HIP: Status: ACTIVE | Noted: 2024-11-18

## 2024-11-18 PROCEDURE — ? COUNSELING

## 2024-11-18 PROCEDURE — 99213 OFFICE O/P EST LOW 20 MIN: CPT | Mod: 25

## 2024-11-18 PROCEDURE — 17000 DESTRUCT PREMALG LESION: CPT

## 2024-11-18 PROCEDURE — ? TREATMENT REGIMEN

## 2024-11-18 PROCEDURE — ? LIQUID NITROGEN

## 2024-11-18 ASSESSMENT — LOCATION SIMPLE DESCRIPTION DERM
LOCATION SIMPLE: RIGHT FOREARM
LOCATION SIMPLE: RIGHT UPPER BACK
LOCATION SIMPLE: RIGHT SHOULDER
LOCATION SIMPLE: LEFT SHOULDER
LOCATION SIMPLE: RIGHT THIGH
LOCATION SIMPLE: LEFT CHEEK
LOCATION SIMPLE: LEFT POSTERIOR THIGH
LOCATION SIMPLE: LEFT FOREARM
LOCATION SIMPLE: LEFT EAR
LOCATION SIMPLE: RIGHT UPPER ARM
LOCATION SIMPLE: RIGHT POSTERIOR THIGH
LOCATION SIMPLE: LEFT UPPER ARM
LOCATION SIMPLE: LEFT THIGH
LOCATION SIMPLE: ANTERIOR SCALP
LOCATION SIMPLE: ABDOMEN
LOCATION SIMPLE: POSTERIOR NECK
LOCATION SIMPLE: RIGHT PRETIBIAL REGION

## 2024-11-18 ASSESSMENT — LOCATION DETAILED DESCRIPTION DERM
LOCATION DETAILED: RIGHT PROXIMAL PRETIBIAL REGION
LOCATION DETAILED: LEFT POSTERIOR SHOULDER
LOCATION DETAILED: RIGHT POSTERIOR SHOULDER
LOCATION DETAILED: RIGHT DISTAL POSTERIOR THIGH
LOCATION DETAILED: RIGHT PROXIMAL POSTERIOR UPPER ARM
LOCATION DETAILED: LEFT ANTERIOR PROXIMAL THIGH
LOCATION DETAILED: RIGHT DISTAL DORSAL FOREARM
LOCATION DETAILED: LEFT SUPERIOR HELIX
LOCATION DETAILED: LEFT PROXIMAL POSTERIOR UPPER ARM
LOCATION DETAILED: EPIGASTRIC SKIN
LOCATION DETAILED: RIGHT SUPERIOR MEDIAL UPPER BACK
LOCATION DETAILED: MID POSTERIOR NECK
LOCATION DETAILED: RIGHT ANTERIOR PROXIMAL THIGH
LOCATION DETAILED: LEFT ANTERIOR SHOULDER
LOCATION DETAILED: LEFT PROXIMAL POSTERIOR THIGH
LOCATION DETAILED: LEFT CENTRAL MALAR CHEEK
LOCATION DETAILED: LEFT DISTAL DORSAL FOREARM
LOCATION DETAILED: RIGHT PROXIMAL POSTERIOR THIGH
LOCATION DETAILED: MID-FRONTAL SCALP

## 2024-11-18 ASSESSMENT — LOCATION ZONE DERM
LOCATION ZONE: NECK
LOCATION ZONE: FACE
LOCATION ZONE: SCALP
LOCATION ZONE: LEG
LOCATION ZONE: TRUNK
LOCATION ZONE: ARM
LOCATION ZONE: EAR

## 2024-11-18 NOTE — PROCEDURE: LIQUID NITROGEN
Render Post-Care Instructions In Note?: no
Show Applicator Variable?: Yes
Number Of Freeze-Thaw Cycles: 1 freeze-thaw cycle
Duration Of Freeze Thaw-Cycle (Seconds): 3
Detail Level: Simple
Consent: The patient's consent was obtained including but not limited to risks of crusting, scabbing, blistering, scarring, darker or lighter pigmentary change, recurrence, incomplete removal and infection.  The patient will follow up one month if the lesions have not resolved.
Post-Care Instructions: I reviewed with the patient in detail post-care instructions. Patient is to wear sunprotection, and avoid picking at any of the treated lesions. Pt may apply Vaseline to crusted or scabbing areas.

## 2024-11-18 NOTE — PROCEDURE: TREATMENT REGIMEN
Detail Level: Simple
Continue Regimen: hydrocortisone 2.5 % topical cream \\nApply to rash on groin once daily for two weeks, then as needed for flares\\n\\nketoconazole 2 % topical cream \\nApply to the rash under groin  daily

## 2025-05-20 ENCOUNTER — APPOINTMENT (OUTPATIENT)
Dept: URBAN - METROPOLITAN AREA CLINIC 167 | Facility: CLINIC | Age: 85
Setting detail: DERMATOLOGY
End: 2025-05-20

## 2025-05-20 DIAGNOSIS — L81.4 OTHER MELANIN HYPERPIGMENTATION: ICD-10-CM

## 2025-05-20 DIAGNOSIS — D18.0 HEMANGIOMA: ICD-10-CM

## 2025-05-20 DIAGNOSIS — Z85.828 PERSONAL HISTORY OF OTHER MALIGNANT NEOPLASM OF SKIN: ICD-10-CM

## 2025-05-20 DIAGNOSIS — Z71.89 OTHER SPECIFIED COUNSELING: ICD-10-CM

## 2025-05-20 DIAGNOSIS — D22 MELANOCYTIC NEVI: ICD-10-CM

## 2025-05-20 DIAGNOSIS — L82.1 OTHER SEBORRHEIC KERATOSIS: ICD-10-CM

## 2025-05-20 PROBLEM — D22.71 MELANOCYTIC NEVI OF RIGHT LOWER LIMB, INCLUDING HIP: Status: ACTIVE | Noted: 2025-05-20

## 2025-05-20 PROBLEM — D22.61 MELANOCYTIC NEVI OF RIGHT UPPER LIMB, INCLUDING SHOULDER: Status: ACTIVE | Noted: 2025-05-20

## 2025-05-20 PROBLEM — D22.72 MELANOCYTIC NEVI OF LEFT LOWER LIMB, INCLUDING HIP: Status: ACTIVE | Noted: 2025-05-20

## 2025-05-20 PROBLEM — D22.5 MELANOCYTIC NEVI OF TRUNK: Status: ACTIVE | Noted: 2025-05-20

## 2025-05-20 PROBLEM — D22.62 MELANOCYTIC NEVI OF LEFT UPPER LIMB, INCLUDING SHOULDER: Status: ACTIVE | Noted: 2025-05-20

## 2025-05-20 PROBLEM — D18.01 HEMANGIOMA OF SKIN AND SUBCUTANEOUS TISSUE: Status: ACTIVE | Noted: 2025-05-20

## 2025-05-20 PROCEDURE — 99213 OFFICE O/P EST LOW 20 MIN: CPT

## 2025-05-20 PROCEDURE — ? COUNSELING

## 2025-05-20 ASSESSMENT — LOCATION DETAILED DESCRIPTION DERM
LOCATION DETAILED: LEFT CENTRAL MALAR CHEEK
LOCATION DETAILED: LEFT PROXIMAL POSTERIOR THIGH
LOCATION DETAILED: LEFT POSTERIOR SHOULDER
LOCATION DETAILED: RIGHT PROXIMAL POSTERIOR THIGH
LOCATION DETAILED: RIGHT DISTAL POSTERIOR THIGH
LOCATION DETAILED: MID POSTERIOR NECK
LOCATION DETAILED: RIGHT PROXIMAL POSTERIOR UPPER ARM
LOCATION DETAILED: LEFT PROXIMAL POSTERIOR UPPER ARM
LOCATION DETAILED: RIGHT PROXIMAL PRETIBIAL REGION
LOCATION DETAILED: EPIGASTRIC SKIN
LOCATION DETAILED: RIGHT POSTERIOR SHOULDER
LOCATION DETAILED: RIGHT SUPERIOR MEDIAL UPPER BACK
LOCATION DETAILED: LEFT ANTERIOR SHOULDER

## 2025-05-20 ASSESSMENT — LOCATION SIMPLE DESCRIPTION DERM
LOCATION SIMPLE: RIGHT PRETIBIAL REGION
LOCATION SIMPLE: RIGHT SHOULDER
LOCATION SIMPLE: RIGHT UPPER ARM
LOCATION SIMPLE: RIGHT POSTERIOR THIGH
LOCATION SIMPLE: POSTERIOR NECK
LOCATION SIMPLE: RIGHT UPPER BACK
LOCATION SIMPLE: LEFT UPPER ARM
LOCATION SIMPLE: LEFT CHEEK
LOCATION SIMPLE: ABDOMEN
LOCATION SIMPLE: LEFT POSTERIOR THIGH
LOCATION SIMPLE: LEFT SHOULDER

## 2025-05-20 ASSESSMENT — LOCATION ZONE DERM
LOCATION ZONE: FACE
LOCATION ZONE: TRUNK
LOCATION ZONE: ARM
LOCATION ZONE: NECK
LOCATION ZONE: LEG